# Patient Record
Sex: MALE | Race: OTHER | Employment: OTHER | URBAN - METROPOLITAN AREA
[De-identification: names, ages, dates, MRNs, and addresses within clinical notes are randomized per-mention and may not be internally consistent; named-entity substitution may affect disease eponyms.]

---

## 2019-08-15 ENCOUNTER — HOSPITAL ENCOUNTER (EMERGENCY)
Age: 44
Discharge: HOME | End: 2019-08-15
Attending: EMERGENCY MEDICINE
Payer: MEDICARE

## 2019-08-15 VITALS
SYSTOLIC BLOOD PRESSURE: 131 MMHG | WEIGHT: 215 LBS | TEMPERATURE: 98.6 F | BODY MASS INDEX: 29.12 KG/M2 | OXYGEN SATURATION: 98 % | HEIGHT: 72 IN | DIASTOLIC BLOOD PRESSURE: 80 MMHG | HEART RATE: 75 BPM | RESPIRATION RATE: 18 BRPM

## 2019-08-15 DIAGNOSIS — Z28.9 VACCINATION DELAYED: Primary | ICD-10-CM

## 2019-08-15 PROCEDURE — 4500000002 HC ER NO CHARGE

## 2019-10-31 ENCOUNTER — APPOINTMENT (OUTPATIENT)
Dept: GENERAL RADIOLOGY | Age: 44
End: 2019-10-31
Payer: MEDICARE

## 2019-10-31 ENCOUNTER — HOSPITAL ENCOUNTER (EMERGENCY)
Age: 44
Discharge: HOME | End: 2019-10-31
Attending: EMERGENCY MEDICINE
Payer: MEDICARE

## 2019-10-31 VITALS
RESPIRATION RATE: 14 BRPM | OXYGEN SATURATION: 99 % | TEMPERATURE: 98.5 F | BODY MASS INDEX: 28.44 KG/M2 | SYSTOLIC BLOOD PRESSURE: 119 MMHG | DIASTOLIC BLOOD PRESSURE: 75 MMHG | HEIGHT: 72 IN | WEIGHT: 210 LBS | HEART RATE: 70 BPM

## 2019-10-31 DIAGNOSIS — S16.1XXA STRAIN OF NECK MUSCLE, INITIAL ENCOUNTER: Primary | ICD-10-CM

## 2019-10-31 DIAGNOSIS — M54.6 ACUTE BILATERAL THORACIC BACK PAIN: ICD-10-CM

## 2019-10-31 PROCEDURE — 72040 X-RAY EXAM NECK SPINE 2-3 VW: CPT

## 2019-10-31 PROCEDURE — 99283 EMERGENCY DEPT VISIT LOW MDM: CPT

## 2019-10-31 PROCEDURE — 6360000002 HC RX W HCPCS: Performed by: PHYSICIAN ASSISTANT

## 2019-10-31 PROCEDURE — 96372 THER/PROPH/DIAG INJ SC/IM: CPT

## 2019-10-31 PROCEDURE — 72072 X-RAY EXAM THORAC SPINE 3VWS: CPT

## 2019-10-31 RX ORDER — IBUPROFEN 800 MG/1
800 TABLET ORAL EVERY 8 HOURS PRN
Qty: 20 TABLET | Refills: 0 | Status: SHIPPED | OUTPATIENT
Start: 2019-10-31

## 2019-10-31 RX ORDER — KETOROLAC TROMETHAMINE 30 MG/ML
60 INJECTION, SOLUTION INTRAMUSCULAR; INTRAVENOUS ONCE
Status: COMPLETED | OUTPATIENT
Start: 2019-10-31 | End: 2019-10-31

## 2019-10-31 RX ORDER — TIZANIDINE 2 MG/1
2 TABLET ORAL EVERY 8 HOURS PRN
Qty: 10 TABLET | Refills: 0 | Status: SHIPPED | OUTPATIENT
Start: 2019-10-31

## 2019-10-31 RX ADMIN — KETOROLAC TROMETHAMINE 60 MG: 30 INJECTION, SOLUTION INTRAMUSCULAR; INTRAVENOUS at 11:36

## 2019-10-31 ASSESSMENT — PAIN SCALES - GENERAL
PAINLEVEL_OUTOF10: 9
PAINLEVEL_OUTOF10: 9

## 2019-10-31 ASSESSMENT — PAIN DESCRIPTION - PAIN TYPE: TYPE: ACUTE PAIN

## 2019-11-24 ENCOUNTER — HOSPITAL ENCOUNTER (EMERGENCY)
Age: 44
Discharge: HOME | End: 2019-11-24
Attending: EMERGENCY MEDICINE
Payer: MEDICARE

## 2019-11-24 VITALS
SYSTOLIC BLOOD PRESSURE: 139 MMHG | HEART RATE: 75 BPM | OXYGEN SATURATION: 98 % | HEIGHT: 72 IN | RESPIRATION RATE: 21 BRPM | WEIGHT: 213 LBS | DIASTOLIC BLOOD PRESSURE: 78 MMHG | BODY MASS INDEX: 28.85 KG/M2 | TEMPERATURE: 98.2 F

## 2019-11-24 DIAGNOSIS — S61.211A LACERATION OF LEFT INDEX FINGER WITHOUT FOREIGN BODY WITHOUT DAMAGE TO NAIL, INITIAL ENCOUNTER: Primary | ICD-10-CM

## 2019-11-24 PROCEDURE — 12001 RPR S/N/AX/GEN/TRNK 2.5CM/<: CPT

## 2019-11-24 PROCEDURE — 99282 EMERGENCY DEPT VISIT SF MDM: CPT

## 2019-11-24 RX ORDER — LIDOCAINE HYDROCHLORIDE 10 MG/ML
20 INJECTION, SOLUTION INFILTRATION; PERINEURAL ONCE
Status: DISCONTINUED | OUTPATIENT
Start: 2019-11-24 | End: 2019-11-24 | Stop reason: HOSPADM

## 2019-11-24 RX ORDER — CEPHALEXIN 500 MG/1
500 CAPSULE ORAL 4 TIMES DAILY
Qty: 20 CAPSULE | Refills: 0 | Status: SHIPPED | OUTPATIENT
Start: 2019-11-24 | End: 2019-11-29

## 2019-11-24 ASSESSMENT — ENCOUNTER SYMPTOMS
SHORTNESS OF BREATH: 0
TROUBLE SWALLOWING: 0
COUGH: 0
VOMITING: 0
NAUSEA: 0

## 2020-03-17 ENCOUNTER — HOSPITAL ENCOUNTER (OUTPATIENT)
Dept: MRI IMAGING | Age: 45
Discharge: HOME | End: 2020-03-19
Payer: MEDICARE

## 2020-03-17 PROCEDURE — 72141 MRI NECK SPINE W/O DYE: CPT

## 2022-03-20 ENCOUNTER — APPOINTMENT (OUTPATIENT)
Dept: GENERAL RADIOLOGY | Age: 47
End: 2022-03-20
Payer: MEDICARE

## 2022-03-20 ENCOUNTER — HOSPITAL ENCOUNTER (EMERGENCY)
Age: 47
Discharge: HOME | End: 2022-03-20
Attending: EMERGENCY MEDICINE
Payer: MEDICARE

## 2022-03-20 VITALS
OXYGEN SATURATION: 98 % | DIASTOLIC BLOOD PRESSURE: 68 MMHG | BODY MASS INDEX: 29.66 KG/M2 | SYSTOLIC BLOOD PRESSURE: 130 MMHG | HEART RATE: 70 BPM | WEIGHT: 219 LBS | TEMPERATURE: 98.3 F | RESPIRATION RATE: 16 BRPM | HEIGHT: 72 IN

## 2022-03-20 DIAGNOSIS — M25.562 ACUTE PAIN OF LEFT KNEE: Primary | ICD-10-CM

## 2022-03-20 PROCEDURE — 99285 EMERGENCY DEPT VISIT HI MDM: CPT

## 2022-03-20 PROCEDURE — 73562 X-RAY EXAM OF KNEE 3: CPT

## 2022-03-20 PROCEDURE — 6370000000 HC RX 637 (ALT 250 FOR IP): Performed by: PHYSICIAN ASSISTANT

## 2022-03-20 RX ORDER — LISINOPRIL 10 MG/1
10 TABLET ORAL DAILY
COMMUNITY

## 2022-03-20 RX ORDER — GLIPIZIDE 10 MG/1
20 TABLET ORAL DAILY
COMMUNITY

## 2022-03-20 RX ORDER — IBUPROFEN 600 MG/1
600 TABLET ORAL ONCE
Status: COMPLETED | OUTPATIENT
Start: 2022-03-20 | End: 2022-03-20

## 2022-03-20 RX ADMIN — IBUPROFEN 600 MG: 600 TABLET ORAL at 13:07

## 2022-03-20 ASSESSMENT — PAIN DESCRIPTION - PAIN TYPE: TYPE: ACUTE PAIN

## 2022-03-20 ASSESSMENT — PAIN DESCRIPTION - ORIENTATION: ORIENTATION: LEFT

## 2022-03-20 ASSESSMENT — PAIN - FUNCTIONAL ASSESSMENT: PAIN_FUNCTIONAL_ASSESSMENT: 0-10

## 2022-03-20 ASSESSMENT — PAIN DESCRIPTION - LOCATION: LOCATION: KNEE

## 2022-03-20 ASSESSMENT — PAIN SCALES - GENERAL
PAINLEVEL_OUTOF10: 5
PAINLEVEL_OUTOF10: 4
PAINLEVEL_OUTOF10: 5

## 2022-03-20 NOTE — ED PROVIDER NOTES
Emergency Department             I reviewed the mid level provider's note and agree with the documented findings and we have discussed the plan of care. I have reviewed the emergency nurses triage note. I agree with the chief complaint, past medical history, past surgical history, allergies, medications, social and family history as documented unless otherwise noted below.      Nikia Moore DO  03/20/22 6852

## 2022-03-20 NOTE — ED PROVIDER NOTES
[id number] [facility] ED  [address] [facility] [address]  Phone: [phone]  Fax: [phone]        Pt Name: Sarah Hale  MRN: [id number]  [place] 1975  Date of evaluation: 3/20/22    [address] [place] [address]       Chief Complaint   Patient presents with    Knee Injury       HISTORY OF PRESENT ILLNESS (Location/Symptom, Timing/Onset, Context/Setting, Quality, Duration, Modifying Factors, Severity)      Sarah Hale is a 40 y.o. male with no pertinent PMH who presents to the ED via private auto with knee pain. Patient states that last night he was walking the dog when the dog, a great Kulwant puppy, pulled him and he was trying to resist the pulling by pushing his feet into the ground holding firm but then his left leg slipped out from underneath him and twisted in some way and he fell to the ground with immediate onset of pain to left knee. States that he is able to stand and ambulate afterwards however is very painful and he limped inside. He does not done much ambulating due to the severity of the pain. Denies history of previous fracture or injury to the knee. States the pain is mostly on the inside into the back of the knee. He has taken some Motrin and utilize ice with some minimal improvement. Denies any fever, chills, numbness, weakness, paresthesias, rash, abrasions, lacerations, bleeding disorders or use of anticoagulation, chest pain, shortness of breath, pain to the surrounding joints, any other injuries, or any other concerns at this time. PAST MEDICAL / SURGICAL / SOCIAL / FAMILY HISTORY     PMH:  has a past medical history of 1st degree AV block, Blind right eye, Diabetes mellitus ([address] and Migraines. Surgical History:  has a past surgical history that includes Appendectomy. Social History:  reports that he has quit smoking. He has a 20.00 pack-year smoking history.  He has never used smokeless tobacco. He reports that he does not drink alcohol and does not use drugs. Family History: has no family status information on file. Family history is unknown by patient. Psychiatric History: None    Allergies: Patient has no known allergies. Home Medications:   Prior to Admission medications    Medication Sig Start Date End Date Taking? Authorizing Provider   lisinopril (PRINIVIL;ZESTRIL) 10 MG tablet Take 10 mg by mouth daily    Historical Provider, MD   glipiZIDE (GLUCOTROL) 10 MG tablet Take 20 mg by mouth daily    Historical Provider, MD   tiZANidine (ZANAFLEX) 2 MG tablet Take 1 tablet by mouth every 8 hours as needed (muscle spasm) 10/31/19   Paul Davis PA-C   ibuprofen (ADVIL;MOTRIN) 800 MG tablet Take 1 tablet by mouth every 8 hours as needed for Pain 10/31/19   Paul Davis PA-C   atorvastatin (LIPITOR) 40 MG tablet Take 1 tablet by mouth daily 5/20/15   Kevin Starr MD   aspirin 81 MG EC tablet Take 1 tablet by mouth daily 5/20/15   Kevin Starr MD   METFORMIN HCL PO   Take 500 mg by mouth 3 times daily (with meals)     Historical Provider, MD       REVIEW OF SYSTEMS  (2-9 systems for level 4, 10 ormore for level 5)      Review of Systems    See HPI. PHYSICAL EXAM  (up to 7 for level 4, 8 or more for level 5)      INITIAL VITALS:  height is 6' (1.829 m) and weight is 99.3 kg (219 lb). His oral temperature is 98.3 °F (36.8 °C). His blood pressure is 130/68 and his pulse is 70. His respiration is 16 and oxygen saturation is 98%. Vital signs reviewed. Physical Exam    General:  Alert, cooperative, well-groomed, well-nourished, appears stated age, and is in no acute distress. Head:  Normocephalic, atraumatic, and without obvious abnormality. Eyes:  Sclerae/conjunctivae clear without injection, pallor, or icterus. Corneas clear without opacities. EOM's intact. ENT: Ears and nose are all without obvious masses lesion or deformity. No oropharynx examination performed due to aerosolization risk during COVID-19 pandemic. Neck: Supple and symmetrical. Trachea midline. No adenopathy. Musculoskeletal: The left knee appears swollen with tenderness to palpation over the medial joint line and posterior aspect of the knee. No calf tenderness. No obvious deformity, ecchymosis, erythema, warmth, abrasions, or lacerations to the area. No other signs of trauma. Full ROM. Negative anterior/posterior drawer tests. Positive pain with Arleth. No crepitus. Significant pain with valgus testing. No pain with varus. Strength at the affected knee joint is 5/5. Sensation is intact to light touch. Strength of hip abductors is 5/5. The surrounding joints are normal in appearance with full ROM and no bony or soft tissue tenderness. DP pulses 2+ and symmetrical. Cap refill <2 seconds. Extremities: Warm and dry without erythema or edema. No venous stasis changes. Skin: Soft, good turgor, and well-hydrated. No obvious rashes or lesions. Neurologic: GCS is 15 and no focal deficits are appreciated. Normal gait. Grossly normal motor and sensation. Speech clear. Psychiatric: Normal mood and affect. Normal behavior. Coherent thought process. DIFFERENTIAL DIAGNOSIS / MDM     The patient presented to the ED with knee pain with a mechanism suggestive of an knee sprain vs contusion vs fracture. Vital signs are stable. The hip and ankle joints were not affected and the knee is stable on exam. There are no lesions, lacerations, or signs of compartment syndrome. Pulses are 2+, motor is 5/5, and sensation is intact. Will obtain x-ray. The patient and/or family and I have discussed the diagnosis and risks, and we agree with discharging home to follow-up with their PCP and/or pertinent providers. The patient appears stable for discharge and has been instructed to return immediately for new concerning symptoms like fever, increased pain, weakness, numbness, color change, or coldness to an extremity or if the current symptoms worsen in any way.  The patient understands that at this time there is no evidence for a more malignant underlying process, but the patient also understands that early in the process of an illness or injury, an emergency department workup can be falsely reassuring. Routine discharge counseling was given, and the patient understands that worsening, changing or persistent symptoms should prompt an immediate call or follow up with their primary physician or return to the emergency department. I have reviewed the disposition diagnosis with the patient and or their family/guardian. I have answered their questions and given discharge instructions. They voiced understanding of these instructions and did not have any further questions or complaints. This patient was seen by the attending physician and they agreed with the assessment and plan. PLAN (LABS / IMAGING / EKG):  Orders Placed This Encounter   Procedures    XR KNEE LEFT (3 VIEWS)    Apply ice to affected area    [facility]; Pair, Left Side Injury; Tall (5'10\"-6'6\"); Knee Immobilizer, Left; 16\"       MEDICATIONS ORDERED:  Orders Placed This Encounter   Medications    ibuprofen (ADVIL;MOTRIN) tablet 600 mg       Controlled Substances Monitoring:     DIAGNOSTIC RESULTS     RADIOLOGY: All images are read by the radiologist and their interpretations are reviewed. XR KNEE LEFT (3 VIEWS)    Result Date: 3/20/2022  EXAMINATION: THREE XRAY VIEWS OF THE LEFT KNEE 3/20/2022 1:28 pm COMPARISON: None HISTORY: ORDERING SYSTEM PROVIDED HISTORY: medial to posterior knee pain - leg slid and he fell TECHNOLOGIST PROVIDED HISTORY: medial to posterior knee pain - leg slid and he fell Reason for Exam: pain, knee medial and posterior from walking dog, fell FINDINGS: No acute fracture. Joints maintain anatomic alignment. No significant degenerative changes. Trace to small joint effusion. Stranding in the infrapatellar fat pad.      1. No acute bony findings in the left knee. 2. Stranding in the left infrapatellar fat pad potentially due to contusion in the setting trauma. 3. Trace to small left knee joint effusion. LABS:  No results found for this visit on 03/20/22. EMERGENCY DEPARTMENT COURSE           Vitals:    Vitals:    03/20/22 1257 03/20/22 1439   BP: 133/79 130/68   Pulse: 75 70   Resp: 16 16   Temp: 98.3 °F (36.8 °C)    TempSrc: Oral    SpO2: 97% 98%   Weight: 99.3 kg (219 lb)    Height: 6' (1.829 m)      -------------------------  BP: 130/68, Temp: 98.3 °F (36.8 °C), Pulse: 70, Resp: 16      RE-EVALUATION:  Patient updated regarding results of the knee x-ray and he states that he feels that the knee is unstable. We will place him in a knee immobilizer and crutches and give him not weightbearing and I have scheduled him for an Ortho 1 appointment for Wednesday morning. Discussed supportive care instructions and he declined any additional pain medication besides Tylenol ibuprofen. CONSULTS:  None    PROCEDURES:  None    FINAL IMPRESSION      1. Acute pain of left knee          DISPOSITION / PLAN     CONDITION ON DISPOSITION:   Good / Stable for discharge.      PATIENT REFERRED TO:  [facility]  [address] [place] [address]  [phone]  Go to         DISCHARGE MEDICATIONS:  Discharge Medication List as of 3/20/2022  2:26 PM          Moshe Almanza   Emergency Medicine Physician Assistant    (Please note that portions of this note were completed with a voice recognition program.  Efforts were made to edit the dictations but occasionally words aremis-transcribed.)        Nathen Cui PA-C  03/20/22 3979

## 2022-03-20 NOTE — ED NOTES
AVS reviewed with the patient. Discussed follow-up care, taking medications as prescribed, and when to call 911. VSS. All questions answered. Ambulatory out of the department with a steady, unassisted gait.         Wilfrid Briggs RN  03/20/22 5921

## 2022-03-23 ENCOUNTER — OFFICE VISIT (OUTPATIENT)
Dept: ORTHOPEDIC SURGERY | Age: 47
End: 2022-03-23
Payer: MEDICARE

## 2022-03-23 VITALS — WEIGHT: 202 LBS | HEIGHT: 72 IN | BODY MASS INDEX: 27.36 KG/M2 | RESPIRATION RATE: 12 BRPM

## 2022-03-23 DIAGNOSIS — M25.562 LEFT KNEE PAIN, UNSPECIFIED CHRONICITY: Primary | ICD-10-CM

## 2022-03-23 DIAGNOSIS — M23.92 INTERNAL DERANGEMENT OF LEFT KNEE: ICD-10-CM

## 2022-03-23 PROCEDURE — 99204 OFFICE O/P NEW MOD 45 MIN: CPT | Performed by: ORTHOPAEDIC SURGERY

## 2022-03-23 PROCEDURE — 1036F TOBACCO NON-USER: CPT | Performed by: ORTHOPAEDIC SURGERY

## 2022-03-23 PROCEDURE — G8419 CALC BMI OUT NRM PARAM NOF/U: HCPCS | Performed by: ORTHOPAEDIC SURGERY

## 2022-03-23 PROCEDURE — G8427 DOCREV CUR MEDS BY ELIG CLIN: HCPCS | Performed by: ORTHOPAEDIC SURGERY

## 2022-03-23 PROCEDURE — G8484 FLU IMMUNIZE NO ADMIN: HCPCS | Performed by: ORTHOPAEDIC SURGERY

## 2022-03-23 ASSESSMENT — ENCOUNTER SYMPTOMS
SHORTNESS OF BREATH: 0
EYE DISCHARGE: 0
ABDOMINAL PAIN: 0
ROS SKIN COMMENTS: NEGATIVE FOR RASH

## 2022-03-23 NOTE — PROGRESS NOTES
[address] ORTHOPEDICS AND SPORTS MEDICINE  [address] [facility] [address]  Dept: [phone]    Ambulatory Orthopedic New Patient Visit      CHIEF COMPLAINT:    Chief Complaint   Patient presents with    Knee Pain     left, DOI- 3/19/22       HISTORY OF PRESENT ILLNESS:      The patient is a 40 y.o. male who is being seen  for consultation and evaluation of left knee pain. The patient reportedly was walking his dog when he inadvertently had his left leg slipped out from underneath him. He noticed significant medial knee pain and was seen in emergency department in [facility] where he is placed in a knee immobilizer and asked to follow-up in our office. Since the injury on 3/19/2022 the patient continues to have pain to the medial aspect of his knee. He notes no previous knee injuries. .      Past Medical History:    Past Medical History:   Diagnosis Date    1st degree AV block     Blind right eye     Diabetes mellitus ([address]     Migraines        Past Surgical History:    Past Surgical History:   Procedure Laterality Date    APPENDECTOMY         Current Medications:   Current Outpatient Medications   Medication Sig Dispense Refill    lisinopril (PRINIVIL;ZESTRIL) 10 MG tablet Take 10 mg by mouth daily      glipiZIDE (GLUCOTROL) 10 MG tablet Take 20 mg by mouth daily      tiZANidine (ZANAFLEX) 2 MG tablet Take 1 tablet by mouth every 8 hours as needed (muscle spasm) 10 tablet 0    ibuprofen (ADVIL;MOTRIN) 800 MG tablet Take 1 tablet by mouth every 8 hours as needed for Pain 20 tablet 0    atorvastatin (LIPITOR) 40 MG tablet Take 1 tablet by mouth daily 30 tablet 3    aspirin 81 MG EC tablet Take 1 tablet by mouth daily 30 tablet 3    METFORMIN HCL PO   Take 500 mg by mouth 3 times daily (with meals)        No current facility-administered medications for this visit.        Allergies:    Patient has no known allergies. Social History:   Social History     Socioeconomic History    Marital status: Single     Spouse name: Not on file    Number of children: Not on file    Years of education: Not on file    Highest education level: Not on file   Occupational History    Not on file   Tobacco Use    Smoking status: Former Smoker     Packs/day: 1.00     Years: 20.00     Pack years: 20.00    Smokeless tobacco: Never Used   Substance and Sexual Activity    Alcohol use: No     Alcohol/week: 0.0 standard drinks    Drug use: Never    Sexual activity: Not on file   Other Topics Concern    Not on file   Social History Narrative    Not on file     Social Determinants of Health     Financial Resource Strain:     Difficulty of Paying Living Expenses: Not on file   Food Insecurity:     Worried About Running Out of Food in the Last Year: Not on file    Cassy of Food in the Last Year: Not on file   Transportation Needs:     Lack of Transportation (Medical): Not on file    Lack of Transportation (Non-Medical):  Not on file   Physical Activity:     Days of Exercise per Week: Not on file    Minutes of Exercise per Session: Not on file   Stress:     Feeling of Stress : Not on file   Social Connections:     Frequency of Communication with Friends and Family: Not on file    Frequency of Social Gatherings with Friends and Family: Not on file    Attends Quaker Services: Not on file    Active Member of [address] [place] [address] or Organizations: Not on file    Attends Club or Organization Meetings: Not on file    Marital Status: Not on file   Intimate Partner Violence:     Fear of Current or Ex-Partner: Not on file    Emotionally Abused: Not on file    Physically Abused: Not on file    Sexually Abused: Not on file   Housing Stability:     Unable to Pay for Housing in the Last Year: Not on file    Number of [place] in the Last Year: Not on file    Unstable Housing in the Last Year: Not on file       Family History:  Family History   Family history unknown: Yes         REVIEW OF SYSTEMS:  Review of Systems   Constitutional: Positive for activity change. Negative for fever. HENT: Negative for dental problem. Eyes: Negative for discharge. Respiratory: Negative for shortness of breath. Cardiovascular: Negative for chest pain. Gastrointestinal: Negative for abdominal pain. Genitourinary: Negative. Musculoskeletal: Positive for arthralgias. Skin:        Negative for rash   Neurological: Positive for weakness. Psychiatric/Behavioral: Negative for confusion. I have reviewed the CC, HPI, ROS, PMH, FHX, Social History. I agree with the documentation provided by other staff, residents and havereviewed their documentation prior to providing my signature indicating agreement. PHYSICAL EXAM:  Resp 12   Ht 6' (1.829 m)   Wt 202 lb (91.6 kg)   BMI 27.40 kg/m²  Body mass index is 27.4 kg/m². Physical Exam  Gen: alert and oriented  Psych:  Appropriate affect; Appropriate knowledge base; Appropriate mood; No hallucinations; Head: normocephalic atraumatic   Chest: symmetric chest excursion  Pelvis: stable  Ortho Exam  Extremity: Evaluation of left knee reveals mild knee effusion. Range of motion is 5 to 95 degrees. Tenderness over the medial joint line is appreciated. No outward signs of trauma to the left knee are noted. Patient ambulates with antalgia or short weightbearing phase to the left lower extremity. Increased pain with varus Arleth's and with valgus stress to the medial aspect of the knee is noted. No instability of the left knee is appreciated. Calves are supple bilaterally. Negative hip logroll and Stinchfield test is noted. Motor, sensory, vascular examination left lower extremity is grossly intact without focal deficits.     Radiology:     XR KNEE LEFT (3 VIEWS)    Result Date: 3/20/2022  EXAMINATION: THREE XRAY VIEWS OF THE LEFT KNEE 3/20/2022 1:28 pm COMPARISON: None HISTORY: ORDERING SYSTEM PROVIDED HISTORY: medial to posterior knee pain - leg slid and he fell TECHNOLOGIST PROVIDED HISTORY: medial to posterior knee pain - leg slid and he fell Reason for Exam: pain, knee medial and posterior from walking dog, fell FINDINGS: No acute fracture. Joints maintain anatomic alignment. No significant degenerative changes. Trace to small joint effusion. Stranding in the infrapatellar fat pad. 1. No acute bony findings in the left knee. 2. Stranding in the left infrapatellar fat pad potentially due to contusion in the setting trauma. 3. Trace to small left knee joint effusion. XR KNEE LEFT (MIN 4 VIEWS)    Result Date: 3/23/2022  History:   Left knee pain Findings:   Standing AP/Lateral/Tunnel/Merchant view xrays of the Left done in the office today shows mild  medial joint space narrowing, tricompartmental osteophytosis, joint line sclerosis medially. No evidence of fracture, subluxation, dislocation, radioopaque foreign body/tumor is noted. Lateral subluxation of the tibia is appreciated. Impression:   Left knee mild  degenerative changes as described above. ASSESSMENT:     1. Left knee pain, unspecified chronicity    2. Internal derangement of left knee         PLAN:       Is felt that the patient most likely has internal derangement of the knee in the form of meniscal pathology with possible medial collateral ligament sprain. In addition further ACL pathology with need to be ruled out as well. MRI has been ordered. The patient is going to limit weightbearing and we plan to see him back after the MRI is complete. No follow-ups on file. No orders of the defined types were placed in this encounter.       Orders Placed This Encounter   Procedures    XR KNEE LEFT (MIN 4 VIEWS)     Standing Status:   Future     Number of Occurrences:   1     Standing Expiration Date:   3/23/2023    MRI KNEE LEFT WO CONTRAST     Standing Status:   Future     Standing Expiration Date:   3/23/2023 This note is created with the assistance of a speech recognition program.  While intending to generate a document that actually reflects the content of the visit, the document can still have some errors including those of syntax and sound a like substitutions which may escape proof reading.  In such instances, actual meaning can be extrapolated by contextual diversion      Electronically signed by Guillermina Miller on 3/23/2022 at 11:59 AM

## 2022-03-25 ENCOUNTER — HOSPITAL ENCOUNTER (OUTPATIENT)
Dept: MRI IMAGING | Age: 47
Discharge: HOME | End: 2022-03-27
Payer: MEDICARE

## 2022-03-25 ENCOUNTER — TELEPHONE (OUTPATIENT)
Dept: ORTHOPEDIC SURGERY | Age: 47
End: 2022-03-25

## 2022-03-25 DIAGNOSIS — M23.92 INTERNAL DERANGEMENT OF LEFT KNEE: ICD-10-CM

## 2022-03-25 PROCEDURE — 73721 MRI JNT OF LWR EXTRE W/O DYE: CPT

## 2022-03-25 NOTE — TELEPHONE ENCOUNTER
Patient called in as he gave an incorrect number to be contacted at. Patient would like the clinical to call him at [phone]. Please advise thank you!

## 2022-03-25 NOTE — TELEPHONE ENCOUNTER
Patient called in again as the wrong number was given again, the correct number to contact the patient is [phone] Please advise thank you!

## 2022-03-25 NOTE — TELEPHONE ENCOUNTER
Discussed with patient that his results are not in and could take more time because it was not a STAT order. Did say he can download [facility] my chart application on his phone or computer and he will be able to see results. Also said he needs to make an appt with  to go over results and treatment options. He will call on Monday to make an appointment.

## 2022-03-25 NOTE — TELEPHONE ENCOUNTER
Patient called and wanted to know the results of his MRI he had done today 03-- he would like a call at [phone] with the results when they are ready- thank you

## 2022-03-30 ENCOUNTER — OFFICE VISIT (OUTPATIENT)
Dept: ORTHOPEDIC SURGERY | Age: 47
End: 2022-03-30
Payer: MEDICARE

## 2022-03-30 VITALS — WEIGHT: 201.2 LBS | HEIGHT: 72 IN | RESPIRATION RATE: 12 BRPM | BODY MASS INDEX: 27.25 KG/M2

## 2022-03-30 DIAGNOSIS — S83.412A SPRAIN OF MEDIAL COLLATERAL LIGAMENT OF LEFT KNEE, INITIAL ENCOUNTER: Primary | ICD-10-CM

## 2022-03-30 PROCEDURE — G8427 DOCREV CUR MEDS BY ELIG CLIN: HCPCS | Performed by: ORTHOPAEDIC SURGERY

## 2022-03-30 PROCEDURE — 99213 OFFICE O/P EST LOW 20 MIN: CPT | Performed by: ORTHOPAEDIC SURGERY

## 2022-03-30 PROCEDURE — G8419 CALC BMI OUT NRM PARAM NOF/U: HCPCS | Performed by: ORTHOPAEDIC SURGERY

## 2022-03-30 PROCEDURE — 1036F TOBACCO NON-USER: CPT | Performed by: ORTHOPAEDIC SURGERY

## 2022-03-30 PROCEDURE — G8484 FLU IMMUNIZE NO ADMIN: HCPCS | Performed by: ORTHOPAEDIC SURGERY

## 2022-03-30 ASSESSMENT — ENCOUNTER SYMPTOMS
SHORTNESS OF BREATH: 0
EYE DISCHARGE: 0
ROS SKIN COMMENTS: NEGATIVE FOR RASH
ABDOMINAL PAIN: 0

## 2022-03-30 NOTE — PROGRESS NOTES
100 [facility] SPORTS [facility]  [address] [address] [place] [address] [address] [address]  Dept: [phone]  Dept Fax: [phone]        Ambulatory Follow Up      Subjective:   Denise Selby is a 40y.o. year old male who presents to our office today for routine followup regarding his   1. Sprain of medial collateral ligament of left knee, initial encounter    . Chief Complaint   Patient presents with    Knee Pain     left, MRI review        HPI     Melissadheeraj Knight presents to the office today for recheck. Is a 55-year-old male who sustained injury to his left knee on 3/19/2022. He notes 20% improvement in his symptoms since his last visit. He is here today chiefly to review her recent MRI of the knee. He continues to note some medial sided knee pain. Review of Systems   Constitutional: Positive for activity change. Negative for fever. HENT: Negative for dental problem. Eyes: Negative for discharge. Respiratory: Negative for shortness of breath. Cardiovascular: Negative for chest pain. Gastrointestinal: Negative for abdominal pain. Genitourinary: Negative. Musculoskeletal: Positive for arthralgias. Skin:        Negative for rash   Neurological: Positive for weakness. Psychiatric/Behavioral: Negative for confusion. I have reviewed the CC, HPI, ROS, PMH, FHX, Social History, and if not present in this note, I have reviewed in the patient's chart. I agree with the documentation provided by other staff and have reviewed their documentation prior to providing my signature indicating agreement. Objective :   Resp 12   Ht 6' (1.829 m)   Wt 201 lb 3.2 oz (91.3 kg)   BMI 27.29 kg/m²  Body mass index is 27.29 kg/m². General: Denise Selby is a 40 y.o. male who is alert and oriented and sitting comfortably in our office.   Ortho Exam  MS: Mild tenderness over the medial collateral ligament is appreciated left knee.  No instability of the left knee is noted. Valgus stress causes some medial sided knee pain but no instability. No significant knee effusion is noted. Patient ambulates without antalgia. Motor, sensory, vascular examination of the left lower extremity is grossly intact without focal deficits. Neuro: alert and oriented to person and place. Eyes: Extra-ocular muscles intact  Mouth: Oral mucosa moist. No perioral lesions  Pulm: Respirations unlabored and regular. Symmetric chest excursion without outward deformity is noted. Skin: warm, well perfused  Psych:   Patient has good fund of knowledge and displays understanging of exam, diagnosis, and plan. Radiology:   XR KNEE LEFT (3 VIEWS)    Result Date: 3/20/2022  EXAMINATION: THREE XRAY VIEWS OF THE LEFT KNEE 3/20/2022 1:28 pm COMPARISON: None HISTORY: ORDERING SYSTEM PROVIDED HISTORY: medial to posterior knee pain - leg slid and he fell TECHNOLOGIST PROVIDED HISTORY: medial to posterior knee pain - leg slid and he fell Reason for Exam: pain, knee medial and posterior from walking dog, fell FINDINGS: No acute fracture. Joints maintain anatomic alignment. No significant degenerative changes. Trace to small joint effusion. Stranding in the infrapatellar fat pad. 1. No acute bony findings in the left knee. 2. Stranding in the left infrapatellar fat pad potentially due to contusion in the setting trauma. 3. Trace to small left knee joint effusion. XR KNEE LEFT (MIN 4 VIEWS)    Result Date: 3/23/2022  History:   Left knee pain Findings:   Standing AP/Lateral/Tunnel/Merchant view xrays of the Left done in the office today shows mild  medial joint space narrowing, tricompartmental osteophytosis, joint line sclerosis medially. No evidence of fracture, subluxation, dislocation, radioopaque foreign body/tumor is noted. Lateral subluxation of the tibia is appreciated. Impression:   Left knee mild  degenerative changes as described above.     MRI KNEE LEFT WO CONTRAST    Result Date: 3/25/2022  EXAMINATION: MRI OF THE LEFT KNEE WITHOUT CONTRAST, 3/25/2022 10:34 am TECHNIQUE: Multiplanar multisequence MRI of the left knee was performed without the administration of intravenous contrast. COMPARISON: None. HISTORY: ORDERING SYSTEM PROVIDED HISTORY: Internal derangement of left knee TECHNOLOGIST PROVIDED HISTORY: Reason for Exam: Medial Left knee pain since fall while walking dog x 5 days. Feels it hyper-extends while walking and hears clicking FINDINGS: MENISCI: No meniscal tear CRUCIATE LIGAMENTS: Anterior cruciate ligament and posterior cruciate ligament are intact EXTENSOR MECHANISM: Quadriceps tendon and patellar tendon are intact. LATERAL COLLATERAL LIGAMENT COMPLEX: The fibular collateral ligament, biceps femoris tendon, popliteus tendon, and iliotibial band are intact. There is mild edema within the popliteus muscle belly however this appears consistent with a muscle strain. MEDIAL COLLATERAL LIGAMENT COMPLEX: The medial collateral ligament is intact. There is edema surrounding the medial collateral ligament. The medial patellar retinaculum is intact. There is a large amount of edema surrounding the medial retinaculum. KNEE JOINT: Patellar cartilage is normal.  Medial and lateral tibiofemoral weight-bearing articular cartilage is intact BONE MARROW: No fracture. 1.  Medial collateral ligament and medial patellar retinaculum sprains. 2.  Muscle strain of the popliteus muscle. Assessment:      1. Sprain of medial collateral ligament of left knee, initial encounter       Plan:      Patient is improving. A hinged knee brace has been ever provided in the form of an economy knee brace. The patient is going to continue home exercise program and activity modification as necessary. I plan to see him back in 2 to 3 months for further evaluation or sooner as necessary. Follow up:No follow-ups on file.     No orders of the defined types were placed in this encounter. No orders of the defined types were placed in this encounter.       This note is created with the assistance of a speech recognition program.  While intending to generate a document that actually reflects the content of the visit, the document can still have some errors including those of syntax and sound a like substitutions which may escape proof reading.  In such instances, actual meaning can be extrapolated by contextual diversion      Electronically signed by Altamese Pallas on 3/30/2022 at 10:56 AM

## 2022-06-15 ENCOUNTER — OFFICE VISIT (OUTPATIENT)
Dept: ORTHOPEDIC SURGERY | Age: 47
End: 2022-06-15
Payer: MEDICARE

## 2022-06-15 VITALS — RESPIRATION RATE: 14 BRPM | WEIGHT: 198 LBS | BODY MASS INDEX: 26.82 KG/M2 | HEIGHT: 72 IN

## 2022-06-15 DIAGNOSIS — S83.412A SPRAIN OF MEDIAL COLLATERAL LIGAMENT OF LEFT KNEE, INITIAL ENCOUNTER: Primary | ICD-10-CM

## 2022-06-15 PROCEDURE — 1036F TOBACCO NON-USER: CPT | Performed by: ORTHOPAEDIC SURGERY

## 2022-06-15 PROCEDURE — G8427 DOCREV CUR MEDS BY ELIG CLIN: HCPCS | Performed by: ORTHOPAEDIC SURGERY

## 2022-06-15 PROCEDURE — G8419 CALC BMI OUT NRM PARAM NOF/U: HCPCS | Performed by: ORTHOPAEDIC SURGERY

## 2022-06-15 PROCEDURE — 99213 OFFICE O/P EST LOW 20 MIN: CPT | Performed by: ORTHOPAEDIC SURGERY

## 2022-06-15 ASSESSMENT — ENCOUNTER SYMPTOMS
EYE DISCHARGE: 0
ROS SKIN COMMENTS: NEGATIVE FOR RASH
SHORTNESS OF BREATH: 0
ABDOMINAL PAIN: 0

## 2022-06-15 NOTE — PROGRESS NOTES
100 [facility] SPORTS [facility]  [address] [address] [place] [address] [address] [address]  Dept: [phone]  Dept Fax: [phone]        Ambulatory Follow Up      Subjective:   Saul Bauman is a 40y.o. year old male who presents to our office today for routine followup regarding his   1. Sprain of medial collateral ligament of left knee, initial encounter    . Chief Complaint   Patient presents with    Knee Pain     left knee       HPI     Mr. Trent Leggett is a 67-year-old male who presents the office today for recheck of his left knee. He initially sustained an injury to his left knee on 3/19/2022. He has been found to have a medial collateral ligament sprain and medial patellar retinacular sprain on clinical and radiographic evaluation with advanced imaging with an MRI. He has been using a hinged knee brace although recently he has not been wearing that. He notes about 75 to 80% improvement in his symptoms. He now only really has pain when getting up from a seated position on the floor when playing with his grandchildren. Otherwise he is very happy with the results so far. Review of Systems   Constitutional: Positive for activity change. Negative for fever. HENT: Negative for dental problem. Eyes: Negative for discharge. Respiratory: Negative for shortness of breath. Cardiovascular: Negative for chest pain. Gastrointestinal: Negative for abdominal pain. Genitourinary: Negative. Musculoskeletal: Positive for arthralgias. Skin:        Negative for rash   Neurological: Positive for weakness. Psychiatric/Behavioral: Negative for confusion. I have reviewed the CC, HPI, ROS, PMH, FHX, Social History, and if not present in this note, I have reviewed in the patient's chart.    I agree with the documentation provided by other staff and have reviewed their documentation prior to providing my signature indicating agreement. Objective :   Resp 14   Ht 6' (1.829 m)   Wt 198 lb (89.8 kg)   BMI 26.85 kg/m²  Body mass index is 26.85 kg/m². General: Gabrielle Min is a 40 y.o. male who is alert and oriented and sitting comfortably in our office. Ortho Exam  MS: Evaluation of the left knee reveals no outward deformity. Minimal tenderness over the medial collateral ligament medial patellar retinaculum is appreciated. No instability of the knee is noted. Patient ambulates without antalgia or short weightbearing phase to the left lower extremity. Motor, sensory, vascular examination left lower extremity is grossly intact without focal deficits. Neuro: alert and oriented to person and place. Eyes: Extra-ocular muscles intact  Mouth: Oral mucosa moist. No perioral lesions  Pulm: Respirations unlabored and regular. Symmetric chest excursion without outward deformity is noted. Skin: warm, well perfused  Psych:   Patient has good fund of knowledge and displays understanging of exam, diagnosis, and plan. Radiology: No x-rays were taken in office today. Assessment:      1. Sprain of medial collateral ligament of left knee, initial encounter       Plan:      The patient is doing quite well and improving consistently. He is going to continue to protect his knee and I plan to see him back as needed. Follow up:No follow-ups on file. No orders of the defined types were placed in this encounter. No orders of the defined types were placed in this encounter.       This note is created with the assistance of a speech recognition program.  While intending to generate a document that actually reflects the content of the visit, the document can still have some errors including those of syntax and sound a like substitutions which may escape proof reading.  In such instances, actual meaning can be extrapolated by contextual diversion      Electronically signed by Fco Tan on 6/15/2022 at 9:59 AM

## 2024-03-25 ENCOUNTER — APPOINTMENT (OUTPATIENT)
Dept: GENERAL RADIOLOGY | Age: 49
End: 2024-03-25
Payer: MEDICARE

## 2024-03-25 ENCOUNTER — HOSPITAL ENCOUNTER (EMERGENCY)
Age: 49
Discharge: HOME | End: 2024-03-26
Attending: EMERGENCY MEDICINE
Payer: MEDICARE

## 2024-03-25 DIAGNOSIS — R07.9 CHEST PAIN, UNSPECIFIED TYPE: Primary | ICD-10-CM

## 2024-03-25 LAB
ANION GAP SERPL CALCULATED.3IONS-SCNC: 10 MMOL/L (ref 9–17)
BASOPHILS # BLD: 0.1 K/UL (ref 0–0.2)
BASOPHILS NFR BLD: 1 % (ref 0–2)
BUN SERPL-MCNC: 14 MG/DL (ref 6–20)
CALCIUM SERPL-MCNC: 9.8 MG/DL (ref 8.6–10.4)
CHLORIDE SERPL-SCNC: 101 MMOL/L (ref 98–107)
CO2 SERPL-SCNC: 29 MMOL/L (ref 20–31)
CREAT SERPL-MCNC: 0.9 MG/DL (ref 0.7–1.2)
D DIMER PPP FEU-MCNC: 0.26 UG/ML FEU
EOSINOPHIL # BLD: 0.1 K/UL (ref 0–0.4)
EOSINOPHILS RELATIVE PERCENT: 1 % (ref 1–4)
ERYTHROCYTE [DISTWIDTH] IN BLOOD BY AUTOMATED COUNT: 12.9 % (ref 12.5–15.4)
GFR SERPL CREATININE-BSD FRML MDRD: >90 ML/MIN/1.73M2
GLUCOSE SERPL-MCNC: 184 MG/DL (ref 70–99)
HCT VFR BLD AUTO: 39.7 % (ref 41–53)
HGB BLD-MCNC: 13.8 G/DL (ref 13.5–17.5)
LYMPHOCYTES NFR BLD: 4.4 K/UL (ref 1–4.8)
LYMPHOCYTES RELATIVE PERCENT: 33 % (ref 24–44)
MCH RBC QN AUTO: 30.2 PG (ref 26–34)
MCHC RBC AUTO-ENTMCNC: 34.7 G/DL (ref 31–37)
MCV RBC AUTO: 87.1 FL (ref 80–100)
MONOCYTES NFR BLD: 0.6 K/UL (ref 0.1–1.2)
MONOCYTES NFR BLD: 5 % (ref 2–11)
NEUTROPHILS NFR BLD: 60 % (ref 36–66)
NEUTS SEG NFR BLD: 8.1 K/UL (ref 1.8–7.7)
PLATELET # BLD AUTO: 247 K/UL (ref 140–450)
PMV BLD AUTO: 8.2 FL (ref 6–12)
POTASSIUM SERPL-SCNC: 3.5 MMOL/L (ref 3.7–5.3)
RBC # BLD AUTO: 4.56 M/UL (ref 4.5–5.9)
SODIUM SERPL-SCNC: 140 MMOL/L (ref 135–144)
TROPONIN I SERPL HS-MCNC: 6 NG/L (ref 0–22)
TROPONIN I SERPL HS-MCNC: <6 NG/L (ref 0–22)
WBC OTHER # BLD: 13.4 K/UL (ref 3.5–11)

## 2024-03-25 PROCEDURE — 85025 COMPLETE CBC W/AUTO DIFF WBC: CPT

## 2024-03-25 PROCEDURE — 96374 THER/PROPH/DIAG INJ IV PUSH: CPT

## 2024-03-25 PROCEDURE — 6360000002 HC RX W HCPCS: Performed by: EMERGENCY MEDICINE

## 2024-03-25 PROCEDURE — 6370000000 HC RX 637 (ALT 250 FOR IP): Performed by: EMERGENCY MEDICINE

## 2024-03-25 PROCEDURE — 93005 ELECTROCARDIOGRAM TRACING: CPT | Performed by: EMERGENCY MEDICINE

## 2024-03-25 PROCEDURE — 71045 X-RAY EXAM CHEST 1 VIEW: CPT

## 2024-03-25 PROCEDURE — 80048 BASIC METABOLIC PNL TOTAL CA: CPT

## 2024-03-25 PROCEDURE — 84484 ASSAY OF TROPONIN QUANT: CPT

## 2024-03-25 PROCEDURE — 99285 EMERGENCY DEPT VISIT HI MDM: CPT

## 2024-03-25 PROCEDURE — 85379 FIBRIN DEGRADATION QUANT: CPT

## 2024-03-25 RX ORDER — MORPHINE SULFATE 4 MG/ML
4 INJECTION, SOLUTION INTRAMUSCULAR; INTRAVENOUS ONCE
Status: DISCONTINUED | OUTPATIENT
Start: 2024-03-25 | End: 2024-03-26 | Stop reason: HOSPADM

## 2024-03-25 RX ORDER — ASPIRIN 81 MG/1
324 TABLET, CHEWABLE ORAL ONCE
Status: COMPLETED | OUTPATIENT
Start: 2024-03-25 | End: 2024-03-25

## 2024-03-25 RX ORDER — KETOROLAC TROMETHAMINE 30 MG/ML
30 INJECTION, SOLUTION INTRAMUSCULAR; INTRAVENOUS ONCE
Status: COMPLETED | OUTPATIENT
Start: 2024-03-25 | End: 2024-03-25

## 2024-03-25 RX ADMIN — KETOROLAC TROMETHAMINE 30 MG: 30 INJECTION, SOLUTION INTRAMUSCULAR; INTRAVENOUS at 21:25

## 2024-03-25 RX ADMIN — ASPIRIN 324 MG: 81 TABLET, CHEWABLE ORAL at 21:25

## 2024-03-25 ASSESSMENT — HEART SCORE: ECG: NON-SPECIFC REPOLARIZATION DISTURBANCE/LBTB/PM

## 2024-03-25 ASSESSMENT — ENCOUNTER SYMPTOMS: SHORTNESS OF BREATH: 0

## 2024-03-25 ASSESSMENT — PAIN DESCRIPTION - ORIENTATION: ORIENTATION: MID

## 2024-03-25 ASSESSMENT — PAIN - FUNCTIONAL ASSESSMENT: PAIN_FUNCTIONAL_ASSESSMENT: 0-10

## 2024-03-25 ASSESSMENT — PAIN SCALES - GENERAL
PAINLEVEL_OUTOF10: 7
PAINLEVEL_OUTOF10: 6

## 2024-03-25 ASSESSMENT — PAIN DESCRIPTION - LOCATION: LOCATION: CHEST

## 2024-03-25 ASSESSMENT — PAIN DESCRIPTION - PAIN TYPE: TYPE: ACUTE PAIN

## 2024-03-26 VITALS
RESPIRATION RATE: 15 BRPM | SYSTOLIC BLOOD PRESSURE: 133 MMHG | HEART RATE: 67 BPM | DIASTOLIC BLOOD PRESSURE: 78 MMHG | WEIGHT: 210 LBS | OXYGEN SATURATION: 96 % | BODY MASS INDEX: 27.83 KG/M2 | TEMPERATURE: 98.2 F | HEIGHT: 73 IN

## 2024-03-26 LAB
EKG ATRIAL RATE: 79 BPM
EKG P AXIS: 53 DEGREES
EKG P-R INTERVAL: 230 MS
EKG Q-T INTERVAL: 350 MS
EKG QRS DURATION: 96 MS
EKG QTC CALCULATION (BAZETT): 401 MS
EKG R AXIS: 58 DEGREES
EKG T AXIS: 53 DEGREES
EKG VENTRICULAR RATE: 79 BPM

## 2024-03-26 NOTE — DISCHARGE INSTRUCTIONS
Return To ER for any worsening chest pain, shortness of breath or any other concerns.  Follow-up with your family physician within the next 1 to 2 days.    If you had imaging today, your results are:  XR CHEST PORTABLE   Final Result   No acute cardiopulmonary process.             Take your medication as indicated and prescribed.  If you are given an antibiotic then, make sure you get the prescription filled and take the antibiotics until finished.      PLEASE RETURN TO THE EMERGENCY DEPARTMENT IMMEDIATELY if your symptoms worsen in anyway or in 8-12 hours if not improved for re-evaluation.  You should immediately return to the ER for symptoms such as increasing pain, bloody stool, fever, a feeling of passing out, light headed, dizziness, chest pain, shortness of breath, persistent nausea and/or vomiting, numbness or weakness to the arms or legs, coolness or color change of the arms or legs.      Please understand that at this time there is no evidence for a more serious underlying process, but that early in the process of an illness or injury, an emergency department workup can be falsely reassuring.  You should contact your family doctor within the next 24 hours for a follow up appointment. If you do not have one, we have attached the \"[facility] Same Day\" Physician line for you to call and they can provide you with one ([phone]). .    THANK YOU!    From [facility] and [facility]    On behalf of the Emergency Department staff at [facility], I would like to thank you for giving us the opportunity to address your health care needs and concerns.    We hope that during your visit, our service was delivered in a professional and caring manner. Please keep [facility] in mind as we walk with you down the path to your own personal wellness.     Please expect an automated text message or email from us so we can ask a few questions about your health and progress. Based on your answers, a  clinician may call you back to offer help and instructions.    Please understand that early in the process of an illness or injury, an emergency department workup can be falsely reassuring.  If you notice any worsening, changing or persistent symptoms please call your family doctor or return to the ER immediately.     Tell us how we did during your visit at [email or web address]   and let us know about your experience

## 2024-03-26 NOTE — ED PROVIDER NOTES
[facility] Emergency Department  [address]  [facility] [address]  Phone: [phone]  Fax: [phone]        [facility] EMERGENCY DEPARTMENT  EMERGENCY DEPARTMENT ENCOUNTER      Pt Name: Josias Macdonald  MRN: [id number]  Birthdate 1975  Date of evaluation: 3/25/2024  Provider: Melissa Ralph MD    CHIEF COMPLAINT       Chief Complaint   Patient presents with    Chest Pain     Mid sternal chest pain that hurts with deep breathes         HISTORY OF PRESENT ILLNESS   (Location/Symptom, Timing/Onset,Context/Setting, Quality, Duration, Modifying Factors, Severity)  Note limiting factors.   Josias Macdonald is a 48 y.o. male who presents to the emergency department with complaints of right-sided chest pain that radiates through to his back.  The pain is worse with deep breathing as well as coughing.  The pain is also reproducible with palpation.  He states he has had it since about 130 this afternoon.  He states it was worse when he bent over to  something.  He does have a history of diabetes.  No family history of coronary artery disease.  He used to smoke but quit about 8 years ago.  No history of coronary artery disease in his parents or siblings.  He does complain of some intermittent chest pain when he is exerting himself.  Today there was no exertion and it did not get any worse with exertion.  He denies any shortness of breath.  Nursing Notes were reviewed.    REVIEW OF SYSTEMS    (2-9systems for level 4, 10 or more for level 5)     Review of Systems   Respiratory:  Negative for shortness of breath.    Cardiovascular:  Positive for chest pain. Negative for palpitations and leg swelling.       Except asnoted above the remainder of the review of systems was reviewed and negative.       PAST MEDICAL HISTORY     Past Medical History:   Diagnosis Date    1st degree AV block     Blind right eye     Diabetes mellitus (HCC)     Migraines          SURGICAL

## 2024-03-26 NOTE — ED PROVIDER NOTES
ADDENDUM   Care of this patient was assumed from Dr. Sanchez.  The patient was seen for Chest Pain (Mid sternal chest pain that hurts with deep breathes)  .  The patient's initial evaluation and plan have been discussed with the prior provider who initially evaluated the patient.  Nursing Notes, Past Medical Hx, Past Surgical Hx, Social Hx, Allergies, and Family Hx were all reviewed.    DIAGNOSTIC RESULTS     EKG: All EKG's are interpreted by the Emergency Department Physician who either signs or Co-signs this chart in the absence of a cardiologist.  Please see dictation of EKG in ED Course or in previous physician note.    RADIOLOGY:  The following imaging tests were ordered, reviewed by me, and interpreted by Radiology    XR CHEST PORTABLE    Result Date: 3/25/2024  EXAMINATION: ONE XRAY VIEW OF THE CHEST 3/25/2024 9:40 pm COMPARISON: None. HISTORY: ORDERING SYSTEM PROVIDED HISTORY: cp TECHNOLOGIST PROVIDED HISTORY: cp Reason for Exam: chest pain, center to right of center, heart area FINDINGS: The heart size is within normal limits. The pulmonary vasculature is also within normal limits. No acute infiltrates are seen. No pneumothoraces are noted.     No acute cardiopulmonary process.       Interpretation per the Radiologist below, if available at the time of this note:  XR CHEST PORTABLE   Final Result   No acute cardiopulmonary process.             LABS:  The following laboratory tests were ordered, reviewed, and interpreted by myself:   Results for orders placed or performed during the hospital encounter of 03/25/24   Basic Metabolic Panel   Result Value Ref Range    Sodium 140 135 - 144 mmol/L    Potassium 3.5 (L) 3.7 - 5.3 mmol/L    Chloride 101 98 - 107 mmol/L    CO2 29 20 - 31 mmol/L    Anion Gap 10 9 - 17 mmol/L    Glucose 184 (H) 70 - 99 mg/dL    BUN 14 6 - 20 mg/dL    Creatinine 0.9 0.7 - 1.2 mg/dL    Est, Glom Filt Rate >90 >60 mL/min/1.73m2    Calcium 9.8 8.6 - 10.4 mg/dL   CBC with Auto Differential  worsen      DISCHARGE MEDICATIONS:  New Prescriptions    No medications on file                     ToJose DO  03/26/24 0001

## 2024-09-28 ENCOUNTER — HOSPITAL ENCOUNTER (EMERGENCY)
Age: 49
Discharge: HOME | End: 2024-09-28
Attending: EMERGENCY MEDICINE
Payer: MEDICARE

## 2024-09-28 ENCOUNTER — APPOINTMENT (OUTPATIENT)
Dept: CT IMAGING | Age: 49
End: 2024-09-28
Payer: MEDICARE

## 2024-09-28 VITALS
BODY MASS INDEX: 28.44 KG/M2 | RESPIRATION RATE: 16 BRPM | HEIGHT: 72 IN | WEIGHT: 210 LBS | HEART RATE: 65 BPM | DIASTOLIC BLOOD PRESSURE: 90 MMHG | TEMPERATURE: 99.4 F | SYSTOLIC BLOOD PRESSURE: 124 MMHG | OXYGEN SATURATION: 98 %

## 2024-09-28 DIAGNOSIS — K62.5 RECTAL BLEEDING: ICD-10-CM

## 2024-09-28 DIAGNOSIS — R10.9 ABDOMINAL PAIN, UNSPECIFIED ABDOMINAL LOCATION: Primary | ICD-10-CM

## 2024-09-28 DIAGNOSIS — K52.9 COLITIS: ICD-10-CM

## 2024-09-28 LAB
ALBUMIN SERPL-MCNC: 4.9 G/DL (ref 3.5–5.2)
ALBUMIN/GLOB SERPL: 1.4 {RATIO} (ref 1–2.5)
ALP SERPL-CCNC: 104 U/L (ref 40–129)
ALT SERPL-CCNC: 33 U/L (ref 5–41)
ANION GAP SERPL CALCULATED.3IONS-SCNC: 13 MMOL/L (ref 9–17)
AST SERPL-CCNC: 22 U/L
BASOPHILS # BLD: 0.1 K/UL (ref 0–0.2)
BASOPHILS NFR BLD: 0 % (ref 0–2)
BILIRUB DIRECT SERPL-MCNC: 0.1 MG/DL
BILIRUB INDIRECT SERPL-MCNC: 0.6 MG/DL (ref 0–1)
BILIRUB SERPL-MCNC: 0.7 MG/DL (ref 0.3–1.2)
BILIRUB UR QL STRIP: NEGATIVE
BUN SERPL-MCNC: 10 MG/DL (ref 6–20)
CALCIUM SERPL-MCNC: 10.2 MG/DL (ref 8.6–10.4)
CHLORIDE SERPL-SCNC: 99 MMOL/L (ref 98–107)
CLARITY UR: CLEAR
CO2 SERPL-SCNC: 27 MMOL/L (ref 20–31)
COLOR UR: YELLOW
COMMENT: ABNORMAL
CREAT SERPL-MCNC: 0.8 MG/DL (ref 0.7–1.2)
EOSINOPHIL # BLD: 0.1 K/UL (ref 0–0.4)
EOSINOPHILS RELATIVE PERCENT: 1 % (ref 1–4)
ERYTHROCYTE [DISTWIDTH] IN BLOOD BY AUTOMATED COUNT: 12.7 % (ref 12.5–15.4)
GFR, ESTIMATED: >90 ML/MIN/1.73M2
GLUCOSE SERPL-MCNC: 191 MG/DL (ref 70–99)
GLUCOSE UR STRIP-MCNC: ABNORMAL MG/DL
HCT VFR BLD AUTO: 45.4 % (ref 41–53)
HGB BLD-MCNC: 15.7 G/DL (ref 13.5–17.5)
HGB UR QL STRIP.AUTO: NEGATIVE
INR PPP: 0.9
KETONES UR STRIP-MCNC: NEGATIVE MG/DL
LACTATE BLDV-SCNC: 1.8 MMOL/L (ref 0.5–2.2)
LEUKOCYTE ESTERASE UR QL STRIP: NEGATIVE
LIPASE SERPL-CCNC: 52 U/L (ref 13–60)
LYMPHOCYTES NFR BLD: 3.2 K/UL (ref 1–4.8)
LYMPHOCYTES RELATIVE PERCENT: 21 % (ref 24–44)
MAGNESIUM SERPL-MCNC: 1.9 MG/DL (ref 1.6–2.6)
MCH RBC QN AUTO: 30.2 PG (ref 26–34)
MCHC RBC AUTO-ENTMCNC: 34.5 G/DL (ref 31–37)
MCV RBC AUTO: 87.6 FL (ref 80–100)
MONOCYTES NFR BLD: 0.5 K/UL (ref 0.1–1.2)
MONOCYTES NFR BLD: 4 % (ref 2–11)
NEUTROPHILS NFR BLD: 74 % (ref 36–66)
NEUTS SEG NFR BLD: 11.1 K/UL (ref 1.8–7.7)
NITRITE UR QL STRIP: NEGATIVE
PH UR STRIP: 7 [PH] (ref 5–8)
PLATELET # BLD AUTO: 281 K/UL (ref 140–450)
PMV BLD AUTO: 8.3 FL (ref 6–12)
POTASSIUM SERPL-SCNC: 4.6 MMOL/L (ref 3.7–5.3)
PROT SERPL-MCNC: 8.3 G/DL (ref 6.4–8.3)
PROT UR STRIP-MCNC: NEGATIVE MG/DL
PROTHROMBIN TIME: 9.4 SEC (ref 9.4–12.6)
RBC # BLD AUTO: 5.19 M/UL (ref 4.5–5.9)
SODIUM SERPL-SCNC: 139 MMOL/L (ref 135–144)
SP GR UR STRIP: 1.01 (ref 1–1.03)
UROBILINOGEN UR STRIP-ACNC: NORMAL EU/DL (ref 0–1)
WBC OTHER # BLD: 15 K/UL (ref 3.5–11)

## 2024-09-28 PROCEDURE — 85610 PROTHROMBIN TIME: CPT

## 2024-09-28 PROCEDURE — 99284 EMERGENCY DEPT VISIT MOD MDM: CPT

## 2024-09-28 PROCEDURE — 80076 HEPATIC FUNCTION PANEL: CPT

## 2024-09-28 PROCEDURE — 6370000000 HC RX 637 (ALT 250 FOR IP): Performed by: NURSE PRACTITIONER

## 2024-09-28 PROCEDURE — 74176 CT ABD & PELVIS W/O CONTRAST: CPT

## 2024-09-28 PROCEDURE — 2580000003 HC RX 258: Performed by: NURSE PRACTITIONER

## 2024-09-28 PROCEDURE — 36415 COLL VENOUS BLD VENIPUNCTURE: CPT

## 2024-09-28 PROCEDURE — 83735 ASSAY OF MAGNESIUM: CPT

## 2024-09-28 PROCEDURE — 96374 THER/PROPH/DIAG INJ IV PUSH: CPT

## 2024-09-28 PROCEDURE — 85025 COMPLETE CBC W/AUTO DIFF WBC: CPT

## 2024-09-28 PROCEDURE — 6360000002 HC RX W HCPCS: Performed by: NURSE PRACTITIONER

## 2024-09-28 PROCEDURE — 80048 BASIC METABOLIC PNL TOTAL CA: CPT

## 2024-09-28 PROCEDURE — 83690 ASSAY OF LIPASE: CPT

## 2024-09-28 PROCEDURE — 83605 ASSAY OF LACTIC ACID: CPT

## 2024-09-28 PROCEDURE — 81003 URINALYSIS AUTO W/O SCOPE: CPT

## 2024-09-28 RX ORDER — PANTOPRAZOLE SODIUM 20 MG/1
40 TABLET, DELAYED RELEASE ORAL DAILY
Qty: 30 TABLET | Refills: 0 | Status: SHIPPED | OUTPATIENT
Start: 2024-09-28 | End: 2024-09-28

## 2024-09-28 RX ORDER — PANTOPRAZOLE SODIUM 20 MG/1
40 TABLET, DELAYED RELEASE ORAL DAILY
Qty: 60 TABLET | Refills: 0 | Status: SHIPPED | OUTPATIENT
Start: 2024-09-28

## 2024-09-28 RX ADMIN — AMOXICILLIN AND CLAVULANATE POTASSIUM 1 TABLET: 875; 125 TABLET, FILM COATED ORAL at 15:52

## 2024-09-28 RX ADMIN — PANTOPRAZOLE SODIUM 40 MG: 40 INJECTION, POWDER, FOR SOLUTION INTRAVENOUS at 15:34

## 2024-09-28 ASSESSMENT — PAIN SCALES - GENERAL: PAINLEVEL_OUTOF10: 7

## 2024-09-28 ASSESSMENT — PAIN - FUNCTIONAL ASSESSMENT: PAIN_FUNCTIONAL_ASSESSMENT: 0-10

## 2024-09-28 ASSESSMENT — PAIN DESCRIPTION - LOCATION: LOCATION: ABDOMEN

## 2024-09-28 NOTE — ED PROVIDER NOTES
[facility] Emergency Department  [address]  [facility] [place] [address]  Phone: [phone]  Fax: [phone]      Attending Physician Attestation    I performed a history and physical examination of the patient and discussed management with the mid level provideer. I reviewed the mid level provider's note and agree with the documented findings and plan of care. Any areas of disagreement are noted on the chart. I was personally present for the key portions of any procedures. I have documented in the chart those procedures where I was not present during the key portions. I have reviewed the emergency nurses triage note. I agree with the chief complaint, past medical history, past surgical history, allergies, medications, social and family history as documented unless otherwise noted below. Documentation of the HPI, Physical Exam and Medical Decision Making performed by mid level providers is based on my personal performance of the HPI, PE and MDM. For Physician Assistant/ Nurse Practitioner cases/documentation I have personally evaluated this patient and have completed at least one if not all key elements of the E/M (history, physical exam, and MDM). Additional findings are as noted.      CHIEF COMPLAINT       Chief Complaint   Patient presents with    Abdominal Pain    Rectal Bleeding     Pt arrives with co abdominal pain ongoing for mths (pt has not gotten evaluated) and rectal bleeding with diarrhea today \"at least twice\". Pt states he does have history of bleeding gastric ulcer.     Diarrhea         PAST MEDICAL HISTORY    has a past medical history of 1st degree AV block, Blind right eye, Diabetes mellitus (HCC), and Migraines.    SURGICAL HISTORY      has a past surgical history that includes Appendectomy.    CURRENT MEDICATIONS       Previous Medications    ASPIRIN 81 MG EC TABLET    Take 1 tablet by mouth daily    ATORVASTATIN (LIPITOR) 40 MG TABLET    Take 1 tablet by mouth daily     GLIPIZIDE (GLUCOTROL) 10 MG TABLET    Take 2 tablets by mouth daily    LISINOPRIL (PRINIVIL;ZESTRIL) 10 MG TABLET    Take 1 tablet by mouth daily    METFORMIN HCL PO      Take 500 mg by mouth 3 times daily (with meals)        ALLERGIES     is allergic to covid-19mrna bival vac moderna.    FAMILY HISTORY     has no family status information on file.      Family history is unknown by patient.    SOCIAL HISTORY      reports that he has quit smoking. He has a 20 pack-year smoking history. He has never used smokeless tobacco. He reports that he does not drink alcohol and does not use drugs.      DIAGNOSTIC RESULTS     EKG: All EKG's are interpreted by the Emergency Department Physician who either signs or Co-signs this chart in the absence of a cardiologist.      RADIOLOGY:   Non-plain film images such as CT, Ultrasound and MRI are read by the radiologist. Plain radiographic images are visualized and the radiologist interpretations are reviewed as follows:     CT ABDOMEN PELVIS WO CONTRAST Additional Contrast? None    (Results Pending)       No results found.      LABS:  Results for orders placed or performed during the hospital encounter of 09/28/24   CBC with Auto Differential   Result Value Ref Range    WBC 15.0 (H) 3.5 - 11.0 k/uL    RBC 5.19 4.5 - 5.9 m/uL    Hemoglobin 15.7 13.5 - 17.5 g/dL    Hematocrit 45.4 41 - 53 %    MCV 87.6 80 - 100 fL    MCH 30.2 26 - 34 pg    MCHC 34.5 31 - 37 g/dL    RDW 12.7 12.5 - 15.4 %    Platelets 281 140 - 450 k/uL    MPV 8.3 6.0 - 12.0 fL    Neutrophils % 74 (H) 36 - 66 %    Lymphocytes % 21 (L) 24 - 44 %    Monocytes % 4 2 - 11 %    Eosinophils % 1 1 - 4 %    Basophils % 0 0 - 2 %    Neutrophils Absolute 11.10 (H) 1.8 - 7.7 k/uL    Lymphocytes Absolute 3.20 1.0 - 4.8 k/uL    Monocytes Absolute 0.50 0.1 - 1.2 k/uL    Eosinophils Absolute 0.10 0.0 - 0.4 k/uL    Basophils Absolute 0.10 0.0 - 0.2 k/uL   BMP   Result Value Ref Range    Sodium 139 135 - 144 mmol/L    Potassium 4.6 3.7 -

## 2024-09-28 NOTE — ED PROVIDER NOTES
[facility] EMERGENCY DEPARTMENT  EMERGENCY DEPARTMENT ENCOUNTER      Pt Name: Josias Macdonald  MRN: [id number]  Birthdate 1975  Date of evaluation: 9/28/2024  Provider: JUANITO Thomas - CNP    CHIEF COMPLAINT       Chief Complaint   Patient presents with    Abdominal Pain    Rectal Bleeding     Pt arrives with co abdominal pain ongoing for mths (pt has not gotten evaluated) and rectal bleeding with diarrhea today \"at least twice\". Pt states he does have history of bleeding gastric ulcer.     Diarrhea         HISTORY OF PRESENT ILLNESS   (Location/Symptom, Timing/Onset, Context/Setting, Quality, Duration, Modifying Factors, Severity)  Note limiting factors.   Josias Macdonald is a 48 y.o. male who presents to the emergency department for evaluation of abdominal pain and rectal bleeding.  Patient states he has had diarrhea for the past 3 to 4 days.  He states he noticed last night that he had blood in his stool.  He reports he had to bowel movements with blood in it again today.  States he does have history of bleeding ulcers, but it was 25 years ago.  He has had an appendectomy in the past no other abdominal surgeries.  He denies any bowel issues in the past.  Does have history of right sided hemiparesis due to CVA, aphasia, diabetes and migraines.  He denies having any chest pain or shortness of breath.  He reports abdominal pain diffuse, nausea no vomiting.  He reports chills no fevers.  He drinks alcohol once a month reportedly.  He is a previous tobacco user reporting he quit several years ago, but he states he will continue to smoke a cigar on occasion.        Nursing Notes were reviewed.    REVIEW OF SYSTEMS     Constitutional: No fevers or chills   HEENT: No sore throat, rhinorrhea, or earache   Eyes: No blurry vision or double vision no drainage   Cardiovascular: No chest pain or tachycardia   Respiratory: No wheezing or shortness of breath no cough   Gastrointestinal: + nausea, no vomiting,

## 2024-09-28 NOTE — DISCHARGE INSTRUCTIONS
Please follow-up with gastroenterology-call Monday morning to schedule an appointment for further evaluation of your symptoms    Avoid fatty, greasy, spicy foods    Take antibiotic twice daily as directed    Drink plenty of fluids to maintain hydration    Take Protonix daily    If symptoms worsen you become lightheaded, chest pain, shortness of breath or any other concerning symptoms please return immediately to the emergency department

## 2024-09-30 ENCOUNTER — TELEPHONE (OUTPATIENT)
Dept: GASTROENTEROLOGY | Age: 49
End: 2024-09-30

## 2024-09-30 ASSESSMENT — ENCOUNTER SYMPTOMS
BACK PAIN: 0
NAUSEA: 0
ABDOMINAL DISTENTION: 0
CHEST TIGHTNESS: 0
COUGH: 0
VOMITING: 0
SHORTNESS OF BREATH: 0
SORE THROAT: 0
RHINORRHEA: 0
CONSTIPATION: 0

## 2024-09-30 NOTE — PROGRESS NOTES
Susan Chase, APRN-CNP  [facility]  [facility] MEDICINE  [address], [address]  [facility] [address]  Dept: [phone]  Dept Fax: [phone]    Patient ID: Josias Macdonald is a 49 y.o. male.    HPI:  Josias Macdonald is a 49 y.o. male who presents to the office today for a first visit and to establish a relationship with a new primary care provider.  Today, the patient complains of nothing. He does relate that recently, he was in the ED with complaints of abdominal pain and rectal bleeding. In the ED, WBC count was elevated (15). CT abdomen/pelvis was completed and negative. He was treated for colitis with PP, Augmentin BID and was instructed to follow up with GI. He relates that he did have some rectal bleeding yesterday morning but non since. He does report a history of bleeding ulcer about 20 years ago.  Pt denies any fever or chills.  Pt today denies any HA, chest pain, or SOB.  Pt denies any N/V/D/C or abdominal pain. Patient does report a history of HTN (was previously on Lisinopril 10 mg daily but has not taken in a couple of months), HLD/TIA (was on Lipitor 40 mg daily and ASA but has not taken in a couple of months), DM (was on Metformin 500 mg TID & Glipizide 20 mg daily but has not taken in a couple of months).   Patient reports compliance with all medications and is tolerating them without side effects.    Previous office notes, labs and diagnostic studies were reviewed prior to and during encounter.The patient's past medical, surgical, social, and family history as well as her current medications and allergies were reviewed as documented in today's encounter by JENIFER Roman.      Current Outpatient Medications on File Prior to Visit   Medication Sig Dispense Refill    pantoprazole (PROTONIX) 20 MG tablet Take 2 tablets by mouth daily 60 tablet 0    amoxicillin-clavulanate (AUGMENTIN) 875-125 MG per tablet Take 1 tablet by mouth 2 times daily for 10

## 2024-09-30 NOTE — TELEPHONE ENCOUNTER
Patient called requesting ED follow up appointment, ROSALES Mendoza 9/28.    Please return call to patient to discuss   Thank you

## 2024-10-01 ENCOUNTER — OFFICE VISIT (OUTPATIENT)
Dept: FAMILY MEDICINE CLINIC | Age: 49
End: 2024-10-01
Payer: MEDICARE

## 2024-10-01 ENCOUNTER — TELEPHONE (OUTPATIENT)
Dept: GASTROENTEROLOGY | Age: 49
End: 2024-10-01

## 2024-10-01 VITALS
BODY MASS INDEX: 27.52 KG/M2 | HEIGHT: 72 IN | HEART RATE: 73 BPM | RESPIRATION RATE: 16 BRPM | TEMPERATURE: 97.2 F | DIASTOLIC BLOOD PRESSURE: 84 MMHG | WEIGHT: 203.2 LBS | SYSTOLIC BLOOD PRESSURE: 134 MMHG | OXYGEN SATURATION: 99 %

## 2024-10-01 DIAGNOSIS — E78.2 MIXED HYPERLIPIDEMIA: ICD-10-CM

## 2024-10-01 DIAGNOSIS — K62.5 RECTAL BLEEDING: ICD-10-CM

## 2024-10-01 DIAGNOSIS — I10 ESSENTIAL HYPERTENSION: Primary | ICD-10-CM

## 2024-10-01 DIAGNOSIS — Z86.73 HISTORY OF TIA (TRANSIENT ISCHEMIC ATTACK): ICD-10-CM

## 2024-10-01 DIAGNOSIS — Z79.899 ON STATIN THERAPY: ICD-10-CM

## 2024-10-01 DIAGNOSIS — R56.9 SEIZURES (HCC): ICD-10-CM

## 2024-10-01 DIAGNOSIS — E11.9 TYPE 2 DIABETES MELLITUS WITHOUT COMPLICATION, WITHOUT LONG-TERM CURRENT USE OF INSULIN (HCC): ICD-10-CM

## 2024-10-01 DIAGNOSIS — Z00.00 PREVENTATIVE HEALTH CARE: ICD-10-CM

## 2024-10-01 DIAGNOSIS — Z76.89 ENCOUNTER TO ESTABLISH CARE: ICD-10-CM

## 2024-10-01 DIAGNOSIS — K52.9 COLITIS: ICD-10-CM

## 2024-10-01 DIAGNOSIS — G81.91 RIGHT HEMIPARESIS (HCC): ICD-10-CM

## 2024-10-01 DIAGNOSIS — K21.9 GASTROESOPHAGEAL REFLUX DISEASE WITHOUT ESOPHAGITIS: ICD-10-CM

## 2024-10-01 LAB — HBA1C MFR BLD: 7.6 %

## 2024-10-01 PROCEDURE — G8427 DOCREV CUR MEDS BY ELIG CLIN: HCPCS | Performed by: NURSE PRACTITIONER

## 2024-10-01 PROCEDURE — 2022F DILAT RTA XM EVC RTNOPTHY: CPT | Performed by: NURSE PRACTITIONER

## 2024-10-01 PROCEDURE — 1036F TOBACCO NON-USER: CPT | Performed by: NURSE PRACTITIONER

## 2024-10-01 PROCEDURE — 83036 HEMOGLOBIN GLYCOSYLATED A1C: CPT | Performed by: NURSE PRACTITIONER

## 2024-10-01 PROCEDURE — G8484 FLU IMMUNIZE NO ADMIN: HCPCS | Performed by: NURSE PRACTITIONER

## 2024-10-01 PROCEDURE — 3075F SYST BP GE 130 - 139MM HG: CPT | Performed by: NURSE PRACTITIONER

## 2024-10-01 PROCEDURE — G8419 CALC BMI OUT NRM PARAM NOF/U: HCPCS | Performed by: NURSE PRACTITIONER

## 2024-10-01 PROCEDURE — 3051F HG A1C>EQUAL 7.0%<8.0%: CPT | Performed by: NURSE PRACTITIONER

## 2024-10-01 PROCEDURE — 99204 OFFICE O/P NEW MOD 45 MIN: CPT | Performed by: NURSE PRACTITIONER

## 2024-10-01 PROCEDURE — 3079F DIAST BP 80-89 MM HG: CPT | Performed by: NURSE PRACTITIONER

## 2024-10-01 RX ORDER — LISINOPRIL 10 MG/1
10 TABLET ORAL DAILY
Qty: 90 TABLET | Refills: 3 | Status: SHIPPED | OUTPATIENT
Start: 2024-10-01

## 2024-10-01 RX ORDER — METFORMIN HCL 500 MG
500 TABLET, EXTENDED RELEASE 24 HR ORAL
Qty: 90 TABLET | Refills: 0 | Status: SHIPPED | OUTPATIENT
Start: 2024-10-01 | End: 2024-12-30

## 2024-10-01 SDOH — ECONOMIC STABILITY: FOOD INSECURITY: WITHIN THE PAST 12 MONTHS, YOU WORRIED THAT YOUR FOOD WOULD RUN OUT BEFORE YOU GOT MONEY TO BUY MORE.: NEVER TRUE

## 2024-10-01 SDOH — ECONOMIC STABILITY: FOOD INSECURITY: WITHIN THE PAST 12 MONTHS, THE FOOD YOU BOUGHT JUST DIDN'T LAST AND YOU DIDN'T HAVE MONEY TO GET MORE.: NEVER TRUE

## 2024-10-01 SDOH — ECONOMIC STABILITY: INCOME INSECURITY: HOW HARD IS IT FOR YOU TO PAY FOR THE VERY BASICS LIKE FOOD, HOUSING, MEDICAL CARE, AND HEATING?: NOT HARD AT ALL

## 2024-10-01 ASSESSMENT — PATIENT HEALTH QUESTIONNAIRE - PHQ9
SUM OF ALL RESPONSES TO PHQ QUESTIONS 1-9: 0
1. LITTLE INTEREST OR PLEASURE IN DOING THINGS: NOT AT ALL
SUM OF ALL RESPONSES TO PHQ QUESTIONS 1-9: 0
SUM OF ALL RESPONSES TO PHQ QUESTIONS 1-9: 0
SUM OF ALL RESPONSES TO PHQ9 QUESTIONS 1 & 2: 0
SUM OF ALL RESPONSES TO PHQ QUESTIONS 1-9: 0
2. FEELING DOWN, DEPRESSED OR HOPELESS: NOT AT ALL

## 2024-10-01 ASSESSMENT — ENCOUNTER SYMPTOMS
DIARRHEA: 1
BLOOD IN STOOL: 1
ABDOMINAL PAIN: 1

## 2024-10-01 NOTE — TELEPHONE ENCOUNTER
attempt 1 unable to lvm box is not set up-aziz  attempt 2 letter sent-yadira    Referred for rectal bleeding

## 2024-10-08 NOTE — TELEPHONE ENCOUNTER
writer attempted to contact patient to schedule vm is not set up unable to leave message or schedule.

## 2024-12-17 ENCOUNTER — TELEPHONE (OUTPATIENT)
Dept: GASTROENTEROLOGY | Age: 49
End: 2024-12-17

## 2024-12-17 NOTE — TELEPHONE ENCOUNTER
Writer unable to leave vm for pt, appointment on 01/29/25 is cancelled due to provider out of the office-letter sent

## 2025-01-01 ASSESSMENT — ENCOUNTER SYMPTOMS
BACK PAIN: 0
SHORTNESS OF BREATH: 0
SORE THROAT: 0
VOMITING: 0
CONSTIPATION: 0
COUGH: 0
NAUSEA: 0
RHINORRHEA: 0
CHEST TIGHTNESS: 0

## 2025-01-02 ENCOUNTER — OFFICE VISIT (OUTPATIENT)
Dept: FAMILY MEDICINE CLINIC | Age: 50
End: 2025-01-02

## 2025-01-02 VITALS
WEIGHT: 202 LBS | TEMPERATURE: 97.6 F | BODY MASS INDEX: 27.36 KG/M2 | RESPIRATION RATE: 16 BRPM | HEIGHT: 72 IN | SYSTOLIC BLOOD PRESSURE: 124 MMHG | DIASTOLIC BLOOD PRESSURE: 78 MMHG | OXYGEN SATURATION: 94 % | HEART RATE: 64 BPM

## 2025-01-02 DIAGNOSIS — G81.91 RIGHT HEMIPARESIS (HCC): ICD-10-CM

## 2025-01-02 DIAGNOSIS — I10 ESSENTIAL HYPERTENSION: ICD-10-CM

## 2025-01-02 DIAGNOSIS — Z00.00 MEDICARE ANNUAL WELLNESS VISIT, SUBSEQUENT: Primary | ICD-10-CM

## 2025-01-02 DIAGNOSIS — Z79.899 ON STATIN THERAPY: ICD-10-CM

## 2025-01-02 DIAGNOSIS — M54.2 NECK PAIN: ICD-10-CM

## 2025-01-02 DIAGNOSIS — E78.2 MIXED HYPERLIPIDEMIA: ICD-10-CM

## 2025-01-02 DIAGNOSIS — R56.9 SEIZURES (HCC): ICD-10-CM

## 2025-01-02 DIAGNOSIS — E11.9 TYPE 2 DIABETES MELLITUS WITHOUT COMPLICATION, WITHOUT LONG-TERM CURRENT USE OF INSULIN (HCC): ICD-10-CM

## 2025-01-02 DIAGNOSIS — Z12.11 COLON CANCER SCREENING: ICD-10-CM

## 2025-01-02 DIAGNOSIS — Z86.73 HISTORY OF TIA (TRANSIENT ISCHEMIC ATTACK): ICD-10-CM

## 2025-01-02 DIAGNOSIS — K21.9 GASTROESOPHAGEAL REFLUX DISEASE WITHOUT ESOPHAGITIS: ICD-10-CM

## 2025-01-02 DIAGNOSIS — Z80.0 FAMILY HISTORY OF COLON CANCER IN FATHER: ICD-10-CM

## 2025-01-02 LAB — HBA1C MFR BLD: 7.4 %

## 2025-01-02 RX ORDER — PREDNISONE 20 MG/1
TABLET ORAL
Qty: 17 TABLET | Refills: 0 | Status: SHIPPED | OUTPATIENT
Start: 2025-01-02 | End: 2025-01-12

## 2025-01-02 RX ORDER — METFORMIN HYDROCHLORIDE 500 MG/1
1000 TABLET, EXTENDED RELEASE ORAL
Qty: 180 TABLET | Refills: 0
Start: 2025-01-02 | End: 2025-04-02

## 2025-01-02 ASSESSMENT — ENCOUNTER SYMPTOMS
DIARRHEA: 0
BLOOD IN STOOL: 0
ABDOMINAL PAIN: 0

## 2025-01-02 ASSESSMENT — LIFESTYLE VARIABLES
HOW OFTEN DO YOU HAVE A DRINK CONTAINING ALCOHOL: MONTHLY OR LESS
HOW MANY STANDARD DRINKS CONTAINING ALCOHOL DO YOU HAVE ON A TYPICAL DAY: 1 OR 2

## 2025-01-02 ASSESSMENT — PATIENT HEALTH QUESTIONNAIRE - PHQ9
SUM OF ALL RESPONSES TO PHQ QUESTIONS 1-9: 0
2. FEELING DOWN, DEPRESSED OR HOPELESS: NOT AT ALL
1. LITTLE INTEREST OR PLEASURE IN DOING THINGS: NOT AT ALL
SUM OF ALL RESPONSES TO PHQ QUESTIONS 1-9: 0
SUM OF ALL RESPONSES TO PHQ9 QUESTIONS 1 & 2: 0
SUM OF ALL RESPONSES TO PHQ QUESTIONS 1-9: 0
SUM OF ALL RESPONSES TO PHQ QUESTIONS 1-9: 0

## 2025-01-02 NOTE — PROGRESS NOTES
Medicare Annual Wellness Visit    Josias Macdonald is here for Medicare AWV (F/U: HTN,HLD,DM )    Assessment & Plan   1. Medicare annual wellness visit, subsequent  - Positive risk factors, screenings and interventions discussed  - Home safety tips provided  - Continue with annual medicare wellness visits  - Will cont with current treatment as pt is currently stable on current treatment  - Continue with all medications as previously prescribed.  - Will cont to follow with all specialists as previously as instructed           Return in about 3 months (around 4/2/2025) for HTN, HLD, DM.     Subjective       Patient's complete Health Risk Assessment and screening values have been reviewed and are found in Flowsheets. The following problems were reviewed today and where indicated follow up appointments were made and/or referrals ordered.    Positive Risk Factor Screenings with Interventions:        Drug Use:   DAST-10 Score: 1  Interpretation: 1-2 indicates low level use. Recommendation: monitor and re-assess at a later date  Interventions:  Patient declined any further intervention or treatment        General HRA Questions:  Select all that apply: (!) New or Increased Pain  Interventions - Pain:  Medication prescribed for new onset neck pain         Dentist Screen:  Have you seen the dentist within the past year?: (!) No    Intervention:  Advised to schedule with their dentist    Hearing Screen:  Do you or your family notice any trouble with your hearing that hasn't been managed with hearing aids?: (!) Yes    Interventions:  Patient declines any further evaluation or treatment    Vision Screen:  Do you have difficulty driving, watching TV, or doing any of your daily activities because of your eyesight?: No  Have you had an eye exam within the past year?: (!) No  Interventions:   Patient encouraged to make appointment with their eye specialist    Safety:  Do you have any tripping hazards - loose or unsecured carpets or 
  Psychiatric:         Mood and Affect: Mood normal.         Behavior: Behavior is cooperative.       ASSESSMENT:   Diagnosis Orders   1. Medicare annual wellness visit, subsequent        2. Essential hypertension  CBC    Comprehensive Metabolic Panel    Lipid Panel      3. Mixed hyperlipidemia  CBC    Comprehensive Metabolic Panel    Lipid Panel      4. On statin therapy        5. Type 2 diabetes mellitus without complication, without long-term current use of insulin (HCC)  POCT glycosylated hemoglobin (Hb A1C)    Hemoglobin A1C      6. Gastroesophageal reflux disease without esophagitis        7. Seizures (HCC)        8. Right hemiparesis (HCC)        9. History of TIA (transient ischemic attack)        10. Family history of colon cancer in father  Kylah Stone MD, Gastroenterology, [place]      11. Colon cancer screening  Kylah Stone MD, Gastroenterology, [place]      12. Neck pain          PLAN:  1. Essential hypertension  - Stable: BP in office today noted; BP: 124/78    - Has not been on his medications over the last couple of months, will get him back on Lisinopril 10 mg daily as previously prescribed  - Low sodium diet and routine exercise encouraged; advised to seek emergent tx if SBP>180 and/or DBP>110, any chest pain, h/a or vision changes.      2. Mixed hyperlipidemia  3. On statin therapy  - Stable: Continue to decrease, fats, carbohydrates, and engage in a healthier diet overall, as well as routine exercise (40 minutes of aerobic exercise- moderate to vigorous intensity three to four times a week).  - Has not been taking medication for a couple of months  - He does think that the Atorvastatin 40 mg daily that he was previously taking caused him joint pain  - Will order updated labs and discuss restarting the medication    4. Type 2 diabetes mellitus without complication, without long-term current use of insulin (HCC)  - Stable: Continue to decrease, fats, carbohydrates, and

## 2025-01-02 NOTE — PATIENT INSTRUCTIONS
approved by many states.  If your form doesn't tell you what to address, it may be hard to know what to include in your advance directive. Use the questions below to help you get started.  Who do you want to make decisions about your medical care if you are not able to?  What life-support measures do you want if you have a serious illness that gets worse over time or can't be cured?  What are you most afraid of that might happen? (Maybe you're afraid of having pain, losing your independence, or being kept alive by machines.)  Where would you prefer to die? (Your home? A hospital? A nursing home?)  Do you want to donate your organs when you die?  Do you want certain Taoist practices performed before you die?  When should you call for help?  Be sure to contact your doctor if you have any questions.  Where can you learn more?  Go to https://www.[employer].net/patientEd and enter R264 to learn more about \"Advance Directives: Care Instructions.\"  Current as of: November 16, 2023  Content Version: 14.2  © 2024 [employer].   Care instructions adapted under license by [employer]. If you have questions about a medical condition or this instruction, always ask your healthcare professional. [employer], [employer] disclaims any warranty or liability for your use of this information.           A Healthy Heart: Care Instructions  Overview     Coronary artery disease, also called heart disease, occurs when a substance called plaque builds up in the vessels that supply oxygen-rich blood to your heart muscle. This can narrow the blood vessels and reduce blood flow. A heart attack happens when blood flow is completely blocked. A high-fat diet, smoking, and other factors increase the risk of heart disease.  Your doctor has found that you have a chance of having heart disease. A heart-healthy lifestyle can help keep your heart healthy and prevent heart disease. This lifestyle includes eating healthy, being active,

## 2025-01-03 ENCOUNTER — TELEPHONE (OUTPATIENT)
Dept: GASTROENTEROLOGY | Age: 50
End: 2025-01-03

## 2025-01-03 NOTE — TELEPHONE ENCOUNTER
Attempt 1, writer called pt to schedule colonoscopy/Ashly.    Attempt 2, writer sent colon letter/screening questionnaire in mail to pt.    Colonoscopy (WQ/Screening)  Dx: Family hx of colon cancer in father; Colon cancer screening    Pt previously had office visit with Dr. Limon scheduled for rectal bleeding however appt was cancelled.

## 2025-01-10 ENCOUNTER — TELEPHONE (OUTPATIENT)
Dept: FAMILY MEDICINE CLINIC | Age: 50
End: 2025-01-10

## 2025-01-10 DIAGNOSIS — M54.2 NECK PAIN: Primary | ICD-10-CM

## 2025-01-10 NOTE — TELEPHONE ENCOUNTER
Pt called stating he was prescribed a steroid for his neck a little over a week ago. For the first 3-4 days, it was getting better but after that the pain has been getting worse. He has been taking it for 9-10 days now. Please advise.

## 2025-01-17 ENCOUNTER — HOSPITAL ENCOUNTER (OUTPATIENT)
Dept: PHYSICAL THERAPY | Facility: CLINIC | Age: 50
Setting detail: THERAPIES SERIES
Discharge: HOME | End: 2025-01-17

## 2025-01-17 NOTE — FLOWSHEET NOTE
[] [facility]  Outpatient Rehabilitation &  Therapy  [address]  [phone]  [phone] [] [facility]  Outpatient Rehabilitation &  Therapy  [address]  P: [phone]  F: [phone] [x] [facility]  Outpatient Rehabilitation &  Therapy  [address]  P: [phone]  F: [phone] [] [facility]  Outpatient Rehabilitation &  Therapy  [address]  [phone]  [phone] [] [facility]  Outpatient Rehabilitation &  Therapy  [address]   P: [phone]  F: [phone]  [] [facility]  Outpatient Rehabilitation &  Therapy  [address]  P: [phone]  F: [phone] [] [facility]  Outpatient Rehabilitation &  Therapy  [address]  P: [phone]  F: [phone] [] [facility]  Outpatient Rehabilitation &  Therapy  [address]  P: [phone]  F: [phone] [] [facility]  Outpatient Rehabilitation &  Therapy  [address]  P: [phone]  F: [phone]  [] [facility] Outpatient Rehabilitation &  Therapy  [address]  P: [phone]  F: [phone]     Therapy Cancel/No Show note    Date: 2025  Patient: Josias Macdonald  : 1975  MRN: [id number]    Cancels/No Shows to date: 1    For today's appointment patient:    [x]  Cancelled    [] Rescheduled appointment    [] No-show     Reason given by patient:    []  Patient ill    []  Conflicting appointment    [] No transportation      [] Conflict with work    [] No reason given    [] Weather related    [] COVID-19    [x] Other:      Comments:  Financial reasons.  Per desk staff, he will call back to reschedule in the future, if he can.      [] Next appointment was confirmed    Electronically signed by: Caity Perkins PT

## 2025-02-25 RX ORDER — METFORMIN HYDROCHLORIDE 500 MG/1
1000 TABLET, EXTENDED RELEASE ORAL
Qty: 180 TABLET | Refills: 0 | OUTPATIENT
Start: 2025-02-25 | End: 2025-05-26

## 2025-04-01 PROBLEM — Z80.0 FAMILY HISTORY OF COLON CANCER IN FATHER: Status: ACTIVE | Noted: 2025-04-01

## 2025-04-01 ASSESSMENT — ENCOUNTER SYMPTOMS
NAUSEA: 0
CHEST TIGHTNESS: 0
SHORTNESS OF BREATH: 0
SORE THROAT: 0
ABDOMINAL PAIN: 0
CONSTIPATION: 0
VOMITING: 0
BACK PAIN: 0
DIARRHEA: 0
RHINORRHEA: 0
COUGH: 0
BLOOD IN STOOL: 0

## 2025-04-01 NOTE — PROGRESS NOTES
Susan Chase, APRN-CNP  [facility]  [facility] MEDICINE  [address], [address]  [facility] [address]  Dept: [phone]  Dept Fax: [phone]    Patient ID: Josias Macdonald is a 49 y.o. male.    HPI: Established pt here today for f/u on chronic medical problems; HTN, HLD, DM, GERD, seizure disorder, go over labs and/or diagnostic studies, and medication refills.  Pt denies any fever or chills.  Pt today denies any HA, chest pain, or SOB.  Pt denies any N/V/D/C or abdominal pain. Today, patient denies complaints.  Patient is taking medications as prescribed and is tolerating them well without significant side effects. Patient is doing well on current treatment.      Previous office notes, labs and diagnostic studies were reviewed prior to and during encounter.The patient's past medical, surgical, social, and family history as well as her current medications and allergies were reviewed as documented in today's encounter by JENIFER Roman.      Current Outpatient Medications on File Prior to Visit   Medication Sig Dispense Refill    pantoprazole (PROTONIX) 20 MG tablet Take 2 tablets by mouth daily 60 tablet 0    aspirin 81 MG EC tablet Take 1 tablet by mouth daily 30 tablet 3    atorvastatin (LIPITOR) 40 MG tablet Take 1 tablet by mouth daily (Patient not taking: Reported on 4/2/2025) 30 tablet 3     No current facility-administered medications on file prior to visit.     SUBJECTIVE:     Review of Systems   Constitutional:  Negative for activity change, fatigue and fever.   HENT:  Negative for congestion, ear pain, rhinorrhea and sore throat.    Respiratory:  Negative for cough, chest tightness and shortness of breath.    Cardiovascular:  Negative for chest pain and palpitations.   Gastrointestinal:  Negative for abdominal pain, blood in stool, constipation, diarrhea, nausea and vomiting.        Heartburn (stable on Protonix 40 mg daily)   Endocrine: Negative for

## 2025-04-02 ENCOUNTER — OFFICE VISIT (OUTPATIENT)
Dept: FAMILY MEDICINE CLINIC | Age: 50
End: 2025-04-02
Payer: MEDICARE

## 2025-04-02 VITALS
SYSTOLIC BLOOD PRESSURE: 118 MMHG | BODY MASS INDEX: 27.36 KG/M2 | RESPIRATION RATE: 16 BRPM | TEMPERATURE: 97.2 F | WEIGHT: 202 LBS | DIASTOLIC BLOOD PRESSURE: 78 MMHG | OXYGEN SATURATION: 99 % | HEIGHT: 72 IN | HEART RATE: 56 BPM

## 2025-04-02 DIAGNOSIS — K21.9 GASTROESOPHAGEAL REFLUX DISEASE WITHOUT ESOPHAGITIS: ICD-10-CM

## 2025-04-02 DIAGNOSIS — Z79.899 ON STATIN THERAPY: ICD-10-CM

## 2025-04-02 DIAGNOSIS — G81.91 RIGHT HEMIPARESIS (HCC): ICD-10-CM

## 2025-04-02 DIAGNOSIS — I10 ESSENTIAL HYPERTENSION: Primary | ICD-10-CM

## 2025-04-02 DIAGNOSIS — E11.9 TYPE 2 DIABETES MELLITUS WITHOUT COMPLICATION, WITHOUT LONG-TERM CURRENT USE OF INSULIN: ICD-10-CM

## 2025-04-02 DIAGNOSIS — Z86.73 HISTORY OF TIA (TRANSIENT ISCHEMIC ATTACK): ICD-10-CM

## 2025-04-02 DIAGNOSIS — E78.2 MIXED HYPERLIPIDEMIA: ICD-10-CM

## 2025-04-02 DIAGNOSIS — R56.9 SEIZURES (HCC): ICD-10-CM

## 2025-04-02 LAB — HBA1C MFR BLD: 6.1 %

## 2025-04-02 PROCEDURE — G8427 DOCREV CUR MEDS BY ELIG CLIN: HCPCS | Performed by: NURSE PRACTITIONER

## 2025-04-02 PROCEDURE — 2022F DILAT RTA XM EVC RTNOPTHY: CPT | Performed by: NURSE PRACTITIONER

## 2025-04-02 PROCEDURE — 3078F DIAST BP <80 MM HG: CPT | Performed by: NURSE PRACTITIONER

## 2025-04-02 PROCEDURE — 3044F HG A1C LEVEL LT 7.0%: CPT | Performed by: NURSE PRACTITIONER

## 2025-04-02 PROCEDURE — 1036F TOBACCO NON-USER: CPT | Performed by: NURSE PRACTITIONER

## 2025-04-02 PROCEDURE — 83036 HEMOGLOBIN GLYCOSYLATED A1C: CPT | Performed by: NURSE PRACTITIONER

## 2025-04-02 PROCEDURE — G8419 CALC BMI OUT NRM PARAM NOF/U: HCPCS | Performed by: NURSE PRACTITIONER

## 2025-04-02 PROCEDURE — 3074F SYST BP LT 130 MM HG: CPT | Performed by: NURSE PRACTITIONER

## 2025-04-02 PROCEDURE — 99214 OFFICE O/P EST MOD 30 MIN: CPT | Performed by: NURSE PRACTITIONER

## 2025-04-02 RX ORDER — LISINOPRIL 10 MG/1
10 TABLET ORAL DAILY
Qty: 90 TABLET | Refills: 3 | Status: SHIPPED | OUTPATIENT
Start: 2025-04-02

## 2025-04-02 RX ORDER — METFORMIN HYDROCHLORIDE 500 MG/1
1000 TABLET, EXTENDED RELEASE ORAL
Qty: 180 TABLET | Refills: 0 | Status: SHIPPED | OUTPATIENT
Start: 2025-04-02 | End: 2025-07-01

## 2025-04-02 SDOH — ECONOMIC STABILITY: FOOD INSECURITY: WITHIN THE PAST 12 MONTHS, THE FOOD YOU BOUGHT JUST DIDN'T LAST AND YOU DIDN'T HAVE MONEY TO GET MORE.: NEVER TRUE

## 2025-04-02 SDOH — ECONOMIC STABILITY: FOOD INSECURITY: WITHIN THE PAST 12 MONTHS, YOU WORRIED THAT YOUR FOOD WOULD RUN OUT BEFORE YOU GOT MONEY TO BUY MORE.: NEVER TRUE

## 2025-05-04 ENCOUNTER — APPOINTMENT (OUTPATIENT)
Dept: GENERAL RADIOLOGY | Age: 50
End: 2025-05-04
Payer: MEDICARE

## 2025-05-04 ENCOUNTER — HOSPITAL ENCOUNTER (EMERGENCY)
Age: 50
Discharge: HOME | End: 2025-05-05
Attending: EMERGENCY MEDICINE
Payer: MEDICARE

## 2025-05-04 DIAGNOSIS — R07.89 ATYPICAL CHEST PAIN: Primary | ICD-10-CM

## 2025-05-04 LAB
ANION GAP SERPL CALCULATED.3IONS-SCNC: 11 MMOL/L (ref 9–17)
BASOPHILS # BLD: 0.1 K/UL (ref 0–0.2)
BASOPHILS NFR BLD: 1 % (ref 0–2)
BUN SERPL-MCNC: 14 MG/DL (ref 6–20)
CALCIUM SERPL-MCNC: 9.2 MG/DL (ref 8.6–10.4)
CHLORIDE SERPL-SCNC: 103 MMOL/L (ref 98–107)
CO2 SERPL-SCNC: 26 MMOL/L (ref 20–31)
CREAT SERPL-MCNC: 0.9 MG/DL (ref 0.7–1.2)
D DIMER PPP FEU-MCNC: <0.27 UG/ML FEU (ref 0–0.59)
EOSINOPHIL # BLD: 0.2 K/UL (ref 0–0.4)
EOSINOPHILS RELATIVE PERCENT: 2 % (ref 1–4)
ERYTHROCYTE [DISTWIDTH] IN BLOOD BY AUTOMATED COUNT: 12.7 % (ref 12.5–15.4)
GFR, ESTIMATED: >90 ML/MIN/1.73M2
GLUCOSE SERPL-MCNC: 103 MG/DL (ref 70–99)
HCT VFR BLD AUTO: 43.3 % (ref 41–53)
HGB BLD-MCNC: 14.7 G/DL (ref 13.5–17.5)
LYMPHOCYTES NFR BLD: 4.3 K/UL (ref 1–4.8)
LYMPHOCYTES RELATIVE PERCENT: 37 % (ref 24–44)
MCH RBC QN AUTO: 30.1 PG (ref 26–34)
MCHC RBC AUTO-ENTMCNC: 33.8 G/DL (ref 31–37)
MCV RBC AUTO: 89 FL (ref 80–100)
MONOCYTES NFR BLD: 0.7 K/UL (ref 0.1–1.2)
MONOCYTES NFR BLD: 6 % (ref 2–11)
NEUTROPHILS NFR BLD: 54 % (ref 36–66)
NEUTS SEG NFR BLD: 6.3 K/UL (ref 1.8–7.7)
PLATELET # BLD AUTO: 261 K/UL (ref 140–450)
PMV BLD AUTO: 8.7 FL (ref 6–12)
POTASSIUM SERPL-SCNC: 3.8 MMOL/L (ref 3.7–5.3)
RBC # BLD AUTO: 4.87 M/UL (ref 4.5–5.9)
SODIUM SERPL-SCNC: 140 MMOL/L (ref 135–144)
TROPONIN I SERPL HS-MCNC: <6 NG/L (ref 0–22)
WBC OTHER # BLD: 11.6 K/UL (ref 3.5–11)

## 2025-05-04 PROCEDURE — 85379 FIBRIN DEGRADATION QUANT: CPT

## 2025-05-04 PROCEDURE — 71045 X-RAY EXAM CHEST 1 VIEW: CPT

## 2025-05-04 PROCEDURE — 93005 ELECTROCARDIOGRAM TRACING: CPT | Performed by: EMERGENCY MEDICINE

## 2025-05-04 PROCEDURE — 6370000000 HC RX 637 (ALT 250 FOR IP): Performed by: EMERGENCY MEDICINE

## 2025-05-04 PROCEDURE — 99285 EMERGENCY DEPT VISIT HI MDM: CPT | Performed by: EMERGENCY MEDICINE

## 2025-05-04 PROCEDURE — 80048 BASIC METABOLIC PNL TOTAL CA: CPT

## 2025-05-04 PROCEDURE — 84484 ASSAY OF TROPONIN QUANT: CPT

## 2025-05-04 PROCEDURE — 85025 COMPLETE CBC W/AUTO DIFF WBC: CPT

## 2025-05-04 RX ORDER — ASPIRIN 81 MG/1
162 TABLET, CHEWABLE ORAL ONCE
Status: COMPLETED | OUTPATIENT
Start: 2025-05-04 | End: 2025-05-04

## 2025-05-04 RX ADMIN — ASPIRIN 162 MG: 81 TABLET, CHEWABLE ORAL at 23:19

## 2025-05-04 ASSESSMENT — PAIN SCALES - GENERAL: PAINLEVEL_OUTOF10: 7

## 2025-05-04 ASSESSMENT — PAIN - FUNCTIONAL ASSESSMENT: PAIN_FUNCTIONAL_ASSESSMENT: 0-10

## 2025-05-05 VITALS
BODY MASS INDEX: 27.5 KG/M2 | OXYGEN SATURATION: 98 % | DIASTOLIC BLOOD PRESSURE: 65 MMHG | TEMPERATURE: 97.7 F | HEIGHT: 72 IN | HEART RATE: 64 BPM | WEIGHT: 203 LBS | SYSTOLIC BLOOD PRESSURE: 108 MMHG | RESPIRATION RATE: 13 BRPM

## 2025-05-05 LAB
EKG ATRIAL RATE: 68 BPM
EKG P AXIS: 55 DEGREES
EKG P-R INTERVAL: 230 MS
EKG Q-T INTERVAL: 380 MS
EKG QRS DURATION: 92 MS
EKG QTC CALCULATION (BAZETT): 404 MS
EKG R AXIS: 54 DEGREES
EKG T AXIS: 58 DEGREES
EKG VENTRICULAR RATE: 68 BPM

## 2025-05-05 PROCEDURE — 93010 ELECTROCARDIOGRAM REPORT: CPT | Performed by: INTERNAL MEDICINE

## 2025-05-05 NOTE — DISCHARGE INSTRUCTIONS
Your cardiac enzymes are negative EKG is unremarkable chest x-ray does not show any lung problem.  At this point in time we do not believe you are having cardiac pain or any indication for a pulmonary embolus.  You are stable to be discharged home to follow-up with your doctor tomorrow return to an ER if worsening anyway ibuprofen and Tylenol for pain.

## 2025-05-05 NOTE — ED PROVIDER NOTES
either signs or Co-signs this chart in the absence of a cardiologist.    EKG is sinus rhythm at 68 beats a minute, CT interval 0.230 QRS duration 0.092 QTc of 404 ms no ST or T wave changes indicative any ongoing ischemia here.  Possible incomplete right bundle branch.    Not indicated unless otherwise documented above    LABS:  Results for orders placed or performed during the hospital encounter of 05/04/25   CBC with Auto Differential   Result Value Ref Range    WBC 11.6 (H) 3.5 - 11.0 k/uL    RBC 4.87 4.5 - 5.9 m/uL    Hemoglobin 14.7 13.5 - 17.5 g/dL    Hematocrit 43.3 41 - 53 %    MCV 89.0 80 - 100 fL    MCH 30.1 26 - 34 pg    MCHC 33.8 31 - 37 g/dL    RDW 12.7 12.5 - 15.4 %    Platelets 261 140 - 450 k/uL    MPV 8.7 6.0 - 12.0 fL    Neutrophils % 54 36 - 66 %    Lymphocytes % 37 24 - 44 %    Monocytes % 6 2 - 11 %    Eosinophils % 2 1 - 4 %    Basophils % 1 0 - 2 %    Neutrophils Absolute 6.30 1.8 - 7.7 k/uL    Lymphocytes Absolute 4.30 1.0 - 4.8 k/uL    Monocytes Absolute 0.70 0.1 - 1.2 k/uL    Eosinophils Absolute 0.20 0.0 - 0.4 k/uL    Basophils Absolute 0.10 0.0 - 0.2 k/uL   Basic Metabolic Panel   Result Value Ref Range    Sodium 140 135 - 144 mmol/L    Potassium 3.8 3.7 - 5.3 mmol/L    Chloride 103 98 - 107 mmol/L    CO2 26 20 - 31 mmol/L    Anion Gap 11 9 - 17 mmol/L    Glucose 103 (H) 70 - 99 mg/dL    BUN 14 6 - 20 mg/dL    Creatinine 0.9 0.7 - 1.2 mg/dL    Est, Glom Filt Rate >90 >60 mL/min/1.73m2    Calcium 9.2 8.6 - 10.4 mg/dL   Troponin   Result Value Ref Range    Troponin, High Sensitivity <6 0 - 22 ng/L   D-Dimer, Quantitative   Result Value Ref Range    D-Dimer, Quant <0.27 0.00 - 0.59 ug/mL FEU       Not indicated unless otherwise documented above    RADIOLOGY:   I reviewed the radiologist interpretations:  XR CHEST PORTABLE   Final Result   No acute cardiopulmonary process.             Not indicated unless otherwise documented above    EMERGENCY DEPARTMENT COURSE:     The patient was given the

## 2025-07-10 ENCOUNTER — OFFICE VISIT (OUTPATIENT)
Dept: FAMILY MEDICINE CLINIC | Age: 50
End: 2025-07-10
Payer: MEDICARE

## 2025-07-10 VITALS
HEIGHT: 72 IN | HEART RATE: 61 BPM | RESPIRATION RATE: 16 BRPM | DIASTOLIC BLOOD PRESSURE: 66 MMHG | OXYGEN SATURATION: 98 % | SYSTOLIC BLOOD PRESSURE: 112 MMHG | BODY MASS INDEX: 26.14 KG/M2 | TEMPERATURE: 97.2 F | WEIGHT: 193 LBS

## 2025-07-10 DIAGNOSIS — E11.9 TYPE 2 DIABETES MELLITUS WITHOUT COMPLICATION, WITHOUT LONG-TERM CURRENT USE OF INSULIN (HCC): ICD-10-CM

## 2025-07-10 DIAGNOSIS — K21.9 GASTROESOPHAGEAL REFLUX DISEASE WITHOUT ESOPHAGITIS: ICD-10-CM

## 2025-07-10 DIAGNOSIS — Z87.898 HISTORY OF SEIZURE: ICD-10-CM

## 2025-07-10 DIAGNOSIS — I10 ESSENTIAL HYPERTENSION: Primary | ICD-10-CM

## 2025-07-10 DIAGNOSIS — E78.2 MIXED HYPERLIPIDEMIA: ICD-10-CM

## 2025-07-10 DIAGNOSIS — Z86.73 HISTORY OF TIA (TRANSIENT ISCHEMIC ATTACK): ICD-10-CM

## 2025-07-10 LAB — HBA1C MFR BLD: 6.4 %

## 2025-07-10 PROCEDURE — 83036 HEMOGLOBIN GLYCOSYLATED A1C: CPT | Performed by: NURSE PRACTITIONER

## 2025-07-10 PROCEDURE — 1036F TOBACCO NON-USER: CPT | Performed by: NURSE PRACTITIONER

## 2025-07-10 PROCEDURE — G8419 CALC BMI OUT NRM PARAM NOF/U: HCPCS | Performed by: NURSE PRACTITIONER

## 2025-07-10 PROCEDURE — 3078F DIAST BP <80 MM HG: CPT | Performed by: NURSE PRACTITIONER

## 2025-07-10 PROCEDURE — 99214 OFFICE O/P EST MOD 30 MIN: CPT | Performed by: NURSE PRACTITIONER

## 2025-07-10 PROCEDURE — G8427 DOCREV CUR MEDS BY ELIG CLIN: HCPCS | Performed by: NURSE PRACTITIONER

## 2025-07-10 PROCEDURE — 3044F HG A1C LEVEL LT 7.0%: CPT | Performed by: NURSE PRACTITIONER

## 2025-07-10 PROCEDURE — 2022F DILAT RTA XM EVC RTNOPTHY: CPT | Performed by: NURSE PRACTITIONER

## 2025-07-10 PROCEDURE — 3074F SYST BP LT 130 MM HG: CPT | Performed by: NURSE PRACTITIONER

## 2025-07-10 ASSESSMENT — ENCOUNTER SYMPTOMS
BACK PAIN: 0
DIARRHEA: 0
CHEST TIGHTNESS: 0
NAUSEA: 0
RHINORRHEA: 0
VOMITING: 0
ABDOMINAL PAIN: 0
SORE THROAT: 0
SHORTNESS OF BREATH: 0
CONSTIPATION: 0
COUGH: 0
BLOOD IN STOOL: 0

## 2025-07-10 NOTE — PROGRESS NOTES
Susan Chase, APRN-CNP  [facility]  [facility] MEDICINE  [address], [address]  [facility] [address]  Dept: [phone]  Dept Fax: [phone]    Patient ID: Josias Macdonald is a 49 y.o. male.    History of Present Illness  The patient is a 49-year-old established patient presenting to the office today to follow up on chronic medical problems, including hypertension, hyperlipidemia, diabetes, GERD, seizures, history of TIA, and history of right hemiparesis.    He has discontinued metformin and atorvastatin and has been using a dietary supplement instead. Initially, he took two doses of the supplement but experienced hypoglycemic episodes, so he reduced the dosage to one. However, he feels that this lower dose is not as effective. He plans to resume taking two doses of the supplement and increase his food intake.    He continues to take aspirin and lisinopril 10 mg. He reports no chest pain or shortness of breath. His bowel movements are regular.     Previous office notes, labs and diagnostic studies were reviewed prior to and during encounter.The patient's past medical, surgical, social, and family history as well as her current medications and allergies were reviewed as documented in today's encounter by JENIFER Roman.      Current Outpatient Medications on File Prior to Visit   Medication Sig Dispense Refill    NONFORMULARY Verbena      lisinopril (PRINIVIL;ZESTRIL) 10 MG tablet Take 1 tablet by mouth daily 90 tablet 3    aspirin 81 MG EC tablet Take 1 tablet by mouth daily 30 tablet 3    metFORMIN (GLUCOPHAGE-XR) 500 MG extended release tablet Take 2 tablets by mouth daily (with breakfast) (Patient not taking: Reported on 7/10/2025) 180 tablet 0    pantoprazole (PROTONIX) 20 MG tablet Take 2 tablets by mouth daily (Patient not taking: Reported on 7/10/2025) 60 tablet 0    atorvastatin (LIPITOR) 40 MG tablet Take 1 tablet by mouth daily (Patient not taking:

## 2025-07-16 ENCOUNTER — TELEPHONE (OUTPATIENT)
Dept: GASTROENTEROLOGY | Age: 50
End: 2025-07-16

## 2025-07-16 RX ORDER — PANTOPRAZOLE SODIUM 20 MG/1
40 TABLET, DELAYED RELEASE ORAL DAILY
Qty: 180 TABLET | Refills: 3 | Status: SHIPPED | OUTPATIENT
Start: 2025-07-16

## 2025-07-16 NOTE — TELEPHONE ENCOUNTER
Received call from patient requesting refill of his protonix. Pt stated he is completely out.    Please call into the [facility] in [place].    Call back #: [phone]

## 2025-07-16 NOTE — TELEPHONE ENCOUNTER
Following up on the TE from Dr. Limon's office earlier today, pt is requesting that Susan cover his script for Protonix until he establishes with Dr. Limon on 7/30/25. I told the pt we would let him know if there are any issues.

## 2025-07-30 ENCOUNTER — TELEPHONE (OUTPATIENT)
Dept: GASTROENTEROLOGY | Age: 50
End: 2025-07-30

## 2025-07-30 ENCOUNTER — HOSPITAL ENCOUNTER (OUTPATIENT)
Age: 50
Discharge: HOME | End: 2025-07-30
Payer: MEDICARE

## 2025-07-30 ENCOUNTER — OFFICE VISIT (OUTPATIENT)
Dept: GASTROENTEROLOGY | Age: 50
End: 2025-07-30
Payer: MEDICARE

## 2025-07-30 VITALS
RESPIRATION RATE: 18 BRPM | HEIGHT: 72 IN | OXYGEN SATURATION: 98 % | BODY MASS INDEX: 26.28 KG/M2 | WEIGHT: 194 LBS | HEART RATE: 61 BPM | DIASTOLIC BLOOD PRESSURE: 74 MMHG | TEMPERATURE: 98.6 F | SYSTOLIC BLOOD PRESSURE: 116 MMHG

## 2025-07-30 DIAGNOSIS — Z12.11 COLON CANCER SCREENING: Primary | ICD-10-CM

## 2025-07-30 DIAGNOSIS — Z80.0 FAMILY HISTORY OF COLON CANCER: Primary | ICD-10-CM

## 2025-07-30 DIAGNOSIS — R53.83 OTHER FATIGUE: ICD-10-CM

## 2025-07-30 LAB
ALBUMIN SERPL-MCNC: 4.5 G/DL (ref 3.5–5.2)
ALBUMIN/GLOB SERPL: 1.8 {RATIO} (ref 1–2.5)
ALP SERPL-CCNC: 66 U/L (ref 40–129)
ALT SERPL-CCNC: 22 U/L (ref 10–50)
ANION GAP SERPL CALCULATED.3IONS-SCNC: 11 MMOL/L (ref 9–16)
AST SERPL-CCNC: 25 U/L (ref 10–50)
BILIRUB DIRECT SERPL-MCNC: 0.2 MG/DL (ref 0–0.2)
BILIRUB INDIRECT SERPL-MCNC: 0.5 MG/DL (ref 0–1)
BILIRUB SERPL-MCNC: 0.7 MG/DL (ref 0–1.2)
BUN SERPL-MCNC: 20 MG/DL (ref 6–20)
CALCIUM SERPL-MCNC: 9.9 MG/DL (ref 8.6–10.4)
CHLORIDE SERPL-SCNC: 103 MMOL/L (ref 98–107)
CO2 SERPL-SCNC: 24 MMOL/L (ref 20–31)
CREAT SERPL-MCNC: 0.8 MG/DL (ref 0.7–1.2)
ERYTHROCYTE [DISTWIDTH] IN BLOOD BY AUTOMATED COUNT: 12.2 % (ref 11.8–14.4)
GFR, ESTIMATED: >90 ML/MIN/1.73M2
GLUCOSE SERPL-MCNC: 151 MG/DL (ref 74–99)
HCT VFR BLD AUTO: 39.2 % (ref 40.7–50.3)
HGB BLD-MCNC: 13.6 G/DL (ref 13–17)
MCH RBC QN AUTO: 30.5 PG (ref 25.2–33.5)
MCHC RBC AUTO-ENTMCNC: 34.7 G/DL (ref 28.4–34.8)
MCV RBC AUTO: 87.9 FL (ref 82.6–102.9)
NRBC BLD-RTO: 0 PER 100 WBC
PLATELET # BLD AUTO: 269 K/UL (ref 138–453)
PMV BLD AUTO: 11 FL (ref 8.1–13.5)
POTASSIUM SERPL-SCNC: 4.3 MMOL/L (ref 3.7–5.3)
PROT SERPL-MCNC: 7 G/DL (ref 6.6–8.7)
RBC # BLD AUTO: 4.46 M/UL (ref 4.21–5.77)
SODIUM SERPL-SCNC: 138 MMOL/L (ref 136–145)
WBC OTHER # BLD: 11.8 K/UL (ref 3.5–11.3)

## 2025-07-30 PROCEDURE — G8419 CALC BMI OUT NRM PARAM NOF/U: HCPCS | Performed by: INTERNAL MEDICINE

## 2025-07-30 PROCEDURE — G2211 COMPLEX E/M VISIT ADD ON: HCPCS | Performed by: INTERNAL MEDICINE

## 2025-07-30 PROCEDURE — 80053 COMPREHEN METABOLIC PANEL: CPT

## 2025-07-30 PROCEDURE — 1036F TOBACCO NON-USER: CPT | Performed by: INTERNAL MEDICINE

## 2025-07-30 PROCEDURE — 99204 OFFICE O/P NEW MOD 45 MIN: CPT | Performed by: INTERNAL MEDICINE

## 2025-07-30 PROCEDURE — G8427 DOCREV CUR MEDS BY ELIG CLIN: HCPCS | Performed by: INTERNAL MEDICINE

## 2025-07-30 PROCEDURE — 36415 COLL VENOUS BLD VENIPUNCTURE: CPT

## 2025-07-30 PROCEDURE — 82248 BILIRUBIN DIRECT: CPT

## 2025-07-30 PROCEDURE — 85027 COMPLETE CBC AUTOMATED: CPT

## 2025-07-30 ASSESSMENT — ENCOUNTER SYMPTOMS
TROUBLE SWALLOWING: 0
WHEEZING: 0
DIARRHEA: 1
NAUSEA: 1
COUGH: 0
ANAL BLEEDING: 0
BLOOD IN STOOL: 1
VOMITING: 0
CONSTIPATION: 1
SORE THROAT: 0
VOICE CHANGE: 0
ABDOMINAL DISTENTION: 1
RECTAL PAIN: 0
CHOKING: 0
ABDOMINAL PAIN: 1

## 2025-07-30 NOTE — TELEPHONE ENCOUNTER
Pt saw Dr. Limon today in clinic. Colonoscopy ordered. Pt states he does not take prescription blood thinners, only baby aspirin. Pt states he does not take GLP-1 medications. Pt would like procedure at [facility].    Procedure scheduled/Dr Limon  Procedure: Colonoscopy (Screening)  Dx: Family hx of colon cancer  Date: Wednesday 09/10/25  Time: 8:30 am/Arrive at 7:00 am  Hospital: [facility]; Entrance C  PAT Phone Call: N/A  Bowel Prep instructions given: Delores Bowel Prep  In office/via phone: in office  Clearance needed: N/A  GLP-1: N/A    Pt informed it is required they must have a /responsible adult that takes them to their procedure, stays at the hospital (before, during, and after procedure), and drives pt home. Pt informed /responsible adult must stay inside the hospital during their procedure. Pt voiced understanding of  protocol for procedure.     Pt informed they will receive a PAT Phone Call from a [facility] PAT Nurse 1-2 weeks prior to procedure. Pt informed they must complete PAT Call or procedure may be cancelled.

## 2025-07-30 NOTE — PROGRESS NOTES
Housing Stability: Low Risk  (4/2/2025)    Housing Stability Vital Sign     Unable to Pay for Housing in the Last Year: No     Number of Times Moved in the Last Year: 0     Homeless in the Last Year: No         REVIEW OF SYSTEMS: A 12-point review of systems was obtained and pertinent positives and negatives were listed below.     REVIEW OF SYSTEMS:     Constitutional: No fever, no chills, no lethargy, no weakness.  HEENT:  No headache, otalgia, itchy eyes, nasal discharge or sore throat.  Cardiac:  No chest pain, dyspnea, orthopnea or PND.  Chest:   No cough, phlegm or wheezing.  Abdomen:      Detailed by MA   Neuro:  No focal weakness, abnormal movements or seizure like activity.  Skin:   No rashes, no itching.  :   No hematuria, no pyuria, no dysuria, no flank pain.  Extremities:  No swelling or joint pains.  ROS was otherwise negative    Review of Systems   Constitutional:  Positive for fatigue (Chronic for a year). Negative for appetite change and unexpected weight change.   HENT:  Negative for sore throat, trouble swallowing and voice change.    Respiratory:  Negative for cough, choking and wheezing.    Cardiovascular:  Negative for chest pain, palpitations and leg swelling.   Gastrointestinal:  Positive for abdominal distention, abdominal pain, blood in stool (Hx of this but not recently), constipation, diarrhea and nausea. Negative for anal bleeding, rectal pain and vomiting.   Allergic/Immunologic: Negative for food allergies.   Neurological:  Positive for dizziness and headaches. Negative for light-headedness.   Hematological:  Does not bruise/bleed easily.       PHYSICAL EXAMINATION: Vital signs reviewed per the nursing documentation.     Resp 18   Ht 1.829 m (6' 0.01\")   BMI 26.17 kg/m²   Body mass index is 26.17 kg/m².   Physical Exam    Physical Exam   Constitutional: Patient is oriented to person, place, and time. Patient appears well-developed and well-nourished.   HENT:   Head: Normocephalic

## 2025-07-31 RX ORDER — POLYETHYLENE GLYCOL 3350, SODIUM SULFATE ANHYDROUS, SODIUM BICARBONATE, SODIUM CHLORIDE, POTASSIUM CHLORIDE 236; 22.74; 6.74; 5.86; 2.97 G/4L; G/4L; G/4L; G/4L; G/4L
POWDER, FOR SOLUTION ORAL
Qty: 4000 ML | Refills: 0 | Status: SHIPPED | OUTPATIENT
Start: 2025-07-31

## 2025-07-31 NOTE — TELEPHONE ENCOUNTER
Writer tried calling pt again, no answer. Unable to leave message due to voicemail not being set up.

## 2025-07-31 NOTE — TELEPHONE ENCOUNTER
Ranjeetr tried calling pt regarding procedure that was scheduled yesterday in clinic. 09/10/25 is not available for Dr. Limon; block time at [place] was released (provider on call). Writer is needing to move pt's procedure to a different date.